# Patient Record
Sex: MALE | Race: WHITE | Employment: UNEMPLOYED | URBAN - METROPOLITAN AREA
[De-identification: names, ages, dates, MRNs, and addresses within clinical notes are randomized per-mention and may not be internally consistent; named-entity substitution may affect disease eponyms.]

---

## 2019-04-09 ENCOUNTER — HOSPITAL ENCOUNTER (EMERGENCY)
Age: 49
Discharge: HOME | End: 2019-04-09
Payer: MEDICAID

## 2019-04-09 VITALS
HEIGHT: 69 IN | HEART RATE: 78 BPM | TEMPERATURE: 97.5 F | WEIGHT: 197 LBS | SYSTOLIC BLOOD PRESSURE: 161 MMHG | BODY MASS INDEX: 29.18 KG/M2 | OXYGEN SATURATION: 96 % | RESPIRATION RATE: 16 BRPM | DIASTOLIC BLOOD PRESSURE: 106 MMHG

## 2019-04-09 DIAGNOSIS — K04.7 DENTAL ABSCESS: Primary | ICD-10-CM

## 2019-04-09 PROCEDURE — 99282 EMERGENCY DEPT VISIT SF MDM: CPT

## 2019-04-09 PROCEDURE — 6370000000 HC RX 637 (ALT 250 FOR IP): Performed by: NURSE PRACTITIONER

## 2019-04-09 RX ORDER — IBUPROFEN 800 MG/1
800 TABLET ORAL EVERY 8 HOURS PRN
Qty: 30 TABLET | Refills: 1 | Status: SHIPPED | OUTPATIENT
Start: 2019-04-09

## 2019-04-09 RX ORDER — IBUPROFEN 800 MG/1
800 TABLET ORAL ONCE
Status: COMPLETED | OUTPATIENT
Start: 2019-04-09 | End: 2019-04-09

## 2019-04-09 RX ORDER — AMOXICILLIN AND CLAVULANATE POTASSIUM 875; 125 MG/1; MG/1
1 TABLET, FILM COATED ORAL 2 TIMES DAILY WITH MEALS
Qty: 20 TABLET | Refills: 0 | Status: SHIPPED | OUTPATIENT
Start: 2019-04-09 | End: 2019-04-19

## 2019-04-09 RX ORDER — HYDROCODONE BITARTRATE AND ACETAMINOPHEN 5; 325 MG/1; MG/1
1 TABLET ORAL ONCE
Status: COMPLETED | OUTPATIENT
Start: 2019-04-09 | End: 2019-04-09

## 2019-04-09 RX ADMIN — HYDROCODONE BITARTRATE AND ACETAMINOPHEN 1 TABLET: 5; 325 TABLET ORAL at 09:05

## 2019-04-09 RX ADMIN — IBUPROFEN 800 MG: 800 TABLET ORAL at 09:04

## 2019-04-09 ASSESSMENT — PAIN SCALES - WONG BAKER: WONGBAKER_NUMERICALRESPONSE: 8

## 2019-04-09 ASSESSMENT — PAIN DESCRIPTION - PROGRESSION: CLINICAL_PROGRESSION: GRADUALLY WORSENING

## 2019-04-09 ASSESSMENT — PAIN DESCRIPTION - PAIN TYPE: TYPE: ACUTE PAIN

## 2019-04-09 ASSESSMENT — PAIN SCALES - GENERAL
PAINLEVEL_OUTOF10: 4
PAINLEVEL_OUTOF10: 10

## 2019-04-09 ASSESSMENT — PAIN DESCRIPTION - FREQUENCY: FREQUENCY: CONTINUOUS

## 2019-04-09 ASSESSMENT — PAIN DESCRIPTION - ORIENTATION: ORIENTATION: LEFT

## 2019-04-09 ASSESSMENT — PAIN DESCRIPTION - LOCATION: LOCATION: TEETH;FACE

## 2019-04-12 NOTE — ED PROVIDER NOTES
[facility]     Department of Emergency Medicine   ED  Provider Note  Admit Date/RoomTime: 4/9/2019  9:03 AM  ED Room: 46/[facility]-1   Chief Complaint   Facial Swelling (complaining of left side facial swelling noticed this am states he does have a bad tooth on that side)    History of Present Illness   Source of history provided by:  patient. History/Exam Limitations: none. Keyur Shahid is a 57 y.o. old male who has a past medical history of:   Past Medical History:   Diagnosis Date    Asthma     Depression     presents to the emergency department by private vehicle, for left upper tooth pain, which occured a few day(s) prior to arrival.  Since onset the symptoms have been constant and moderate in severity. Worsened by  heat, cold and chewing and improved by nothing. Associated Signs & Symptoms:  Facial pain. He denies any fever or chills. He does not have a dentist           Onset:       Spontaneous:   yes. Following Trauma:   no.     Previous Caries:   yes. Recent Dental Procedure:   no.     ROS    Pertinent positives and negatives are stated within HPI, all other systems reviewed and are negative. .    Past Surgical History:  has no past surgical history on file. Social History:  reports that he has quit smoking. He has never used smokeless tobacco. He reports that he has current or past drug history. Drug: Marijuana. He reports that he does not drink alcohol. Family History: family history is not on file. Allergies: Nickel    Physical Exam           ED Triage Vitals   BP Temp Temp Source Pulse Resp SpO2 Height Weight   04/09/19 0859 04/09/19 0859 04/09/19 0859 04/09/19 0807 04/09/19 0859 04/09/19 0807 04/09/19 0859 04/09/19 0859   (!) 187/116 97.5 °F (36.4 °C) Temporal 78 16 95 % 5' 9\" (1.753 m) 197 lb (89.4 kg)      Oxygen Saturation Interpretation: Normal.    · Constitutional:  Alert, development consistent with age. · HEENT:  NC/NT. Airway patent. · Neck:  Supple.  Normal ROM.  · Lips:  upper and lower normal.  · Mouth:  normal tongue and buccal mucosa. TTP to left lower posterior molar with surrounding induration, no definite fluctuance. Trismus: No.         Drooling: No.           Airway stridor: No.  · Facial skin: left no erythema, rash or swelling noted. · Respiratory:  Clear to auscultation and breath sounds equal.    · CV: Regular rate and rhythm, normal heart sounds, without pathological murmurs, ectopy, gallops, or rubs. · Skin:  No rashes, erythema or lesions present, unless noted elsewhere. .  · Lymphatics: No lymphangitis or adenopathy noted. · Neurological:  Oriented. Motor functions intact. Lab / Imaging Results   (All laboratory and radiology results have been personally reviewed by myself)  Labs:  No results found for this visit on 04/09/19. Imaging: All Radiology results interpreted by Radiologist unless otherwise noted. No orders to display     ED Course / Medical Decision Making     Medications   ibuprofen (ADVIL;MOTRIN) tablet 800 mg (800 mg Oral Given 4/9/19 0904)   HYDROcodone-acetaminophen (NORCO) 5-325 MG per tablet 1 tablet (1 tablet Oral Given 4/9/19 0905)        Consult(s):   Dental Resident was not consulted to see patient regarding complaint. Procedure(s):    none. Counseling/MDM:  Patient is well appearing, afebrile, VS stable. Dental pain, possible surrounding abscess. The emergency provider has spoken with the patient and discussed todays results, in addition to providing specific details for the plan of care and counseling regarding the diagnosis and prognosis. Plan is for symptom control and outpatient follow up with dentist. Questions are answered at this time and they are agreeable with the plan. Assessment      1.  Dental abscess      Plan   Discharge to home  Patient condition is good    New Medications     Discharge Medication List as of 4/9/2019  9:12 AM      START taking these medications    Details amoxicillin-clavulanate (AUGMENTIN) 875-125 MG per tablet Take 1 tablet by mouth 2 times daily (with meals) for 10 days, Disp-20 tablet, R-0Print      Magic Mouthwash (MIRACLE MOUTHWASH) Swish and spit 5 mLs 4 times daily as needed for Irritation, Disp-240 mL, R-0Print      ibuprofen (IBU) 800 MG tablet Take 1 tablet by mouth every 8 hours as needed for Pain Take with food. , Disp-30 tablet, R-1Print           Electronically signed by WALLY Franks CNP   DD: 4/12/19  **This report was transcribed using voice recognition software. Every effort was made to ensure accuracy; however, inadvertent computerized transcription errors may be present.   END OF ED PROVIDER NOTE      WALLY Mcgee CNP  04/12/19 9714

## 2019-07-31 ENCOUNTER — HOSPITAL ENCOUNTER (EMERGENCY)
Age: 49
Discharge: HOME | End: 2019-07-31
Attending: EMERGENCY MEDICINE
Payer: MEDICAID

## 2019-07-31 ENCOUNTER — APPOINTMENT (OUTPATIENT)
Dept: GENERAL RADIOLOGY | Age: 49
End: 2019-07-31
Payer: MEDICAID

## 2019-07-31 VITALS
BODY MASS INDEX: 29.55 KG/M2 | WEIGHT: 195 LBS | RESPIRATION RATE: 16 BRPM | OXYGEN SATURATION: 98 % | DIASTOLIC BLOOD PRESSURE: 80 MMHG | TEMPERATURE: 98 F | SYSTOLIC BLOOD PRESSURE: 136 MMHG | HEART RATE: 80 BPM | HEIGHT: 68 IN

## 2019-07-31 DIAGNOSIS — S82.832A OTHER CLOSED FRACTURE OF DISTAL END OF LEFT FIBULA, INITIAL ENCOUNTER: Primary | ICD-10-CM

## 2019-07-31 PROCEDURE — 73610 X-RAY EXAM OF ANKLE: CPT

## 2019-07-31 PROCEDURE — 99283 EMERGENCY DEPT VISIT LOW MDM: CPT

## 2019-07-31 RX ORDER — OXYCODONE HYDROCHLORIDE AND ACETAMINOPHEN 5; 325 MG/1; MG/1
1 TABLET ORAL EVERY 8 HOURS PRN
Qty: 6 TABLET | Refills: 0 | Status: SHIPPED | OUTPATIENT
Start: 2019-07-31 | End: 2019-08-02

## 2019-07-31 RX ORDER — NAPROXEN 500 MG/1
500 TABLET ORAL 2 TIMES DAILY
Qty: 10 TABLET | Refills: 0 | Status: SHIPPED | OUTPATIENT
Start: 2019-07-31 | End: 2021-03-03 | Stop reason: ALTCHOICE

## 2019-07-31 ASSESSMENT — PAIN SCALES - GENERAL: PAINLEVEL_OUTOF10: 8

## 2019-07-31 ASSESSMENT — PAIN DESCRIPTION - PROGRESSION: CLINICAL_PROGRESSION: GRADUALLY WORSENING

## 2019-07-31 ASSESSMENT — PAIN DESCRIPTION - PAIN TYPE: TYPE: ACUTE PAIN

## 2019-07-31 ASSESSMENT — PAIN DESCRIPTION - FREQUENCY: FREQUENCY: CONTINUOUS

## 2019-07-31 ASSESSMENT — PAIN DESCRIPTION - ORIENTATION: ORIENTATION: LEFT

## 2019-07-31 ASSESSMENT — PAIN DESCRIPTION - LOCATION: LOCATION: ANKLE

## 2019-07-31 NOTE — ED NOTES
Discharge instructions to pt to follow up with Dr Harrison Ackerman short leg OCL applied per Prema Reid left leg toes warm pedal pulse palapable moving left toes easily brisk capillary refill crutches to pt with instruction ice continued     Jayna Kerr RN  07/31/19 4036

## 2019-07-31 NOTE — ED PROVIDER NOTES
HPI:  7/31/19, Time: 9:37 AM         Abi Ragland is a 57 y.o. male presenting to the ED for left ankle pain, beginning of hours ago. The complaint has been persistent, moderate in severity, and worsened by walking. States he twisted his left ankle stepping down off a patio last night. Denies any head trauma or loss of consciousness. Describes the pain is sharp and on the lateral aspect. Denies any numbness or tingling. Denies any previous injury to this ankle. Review of Systems:   Pertinent positives and negatives are stated within HPI, all other systems reviewed and are negative.          --------------------------------------------- PAST HISTORY ---------------------------------------------  Past Medical History:  has a past medical history of Asthma and Depression. Past Surgical History:  has no past surgical history on file. Social History:  reports that he has quit smoking. He has never used smokeless tobacco. He reports that he has current or past drug history. Drug: Marijuana. He reports that he does not drink alcohol. Family History: family history is not on file. The patients home medications have been reviewed. Allergies: Nickel    -------------------------------------------------- RESULTS -------------------------------------------------  All laboratory and radiology results have been personally reviewed by myself   LABS:  No results found for this visit on 07/31/19. RADIOLOGY:  Interpreted by Radiologist.  XR ANKLE LEFT (MIN 3 VIEWS)   Final Result   ALERT:  THIS IS AN ABNORMAL REPORT   1. Minimally displaced distal fibular/lateral malleolus transverse   fracture. 2. Soft tissue swelling overlying lateral malleolus. ------------------------- NURSING NOTES AND VITALS REVIEWED ---------------------------   The nursing notes within the ED encounter and vital signs as below have been reviewed.    BP (!) 152/84   Pulse 80   Temp 98 °F (36.7 °C)   Resp 16   Ht 5' 8\" (1.727 m)   Wt 195 lb (88.5 kg)   SpO2 98%   BMI 29.65 kg/m²   Oxygen Saturation Interpretation: Normal      ---------------------------------------------------PHYSICAL EXAM--------------------------------------      Constitutional/General: Alert and oriented x3, well appearing, non toxic in NAD  Head: Normocephalic and atraumatic  Eyes: PERRL, EOMI  Mouth: Oropharynx clear, handling secretions, no trismus  Neck: Supple, full ROM,   Pulmonary: Lungs clear to auscultation bilaterally, no wheezes, rales, or rhonchi. Not in respiratory distress  Cardiovascular:  Regular rate and rhythm, no murmurs, gallops, or rubs. 2+ distal pulses  Abdomen: Soft, non tender, non distended,   Extremities: Moves all extremities x 4. Warm and well perfused. TTP of the lateral ankle with edema and mild ecchymosis. Skin: warm and dry without rash  Neurologic: GCS 15,  Psych: Normal Affect      ------------------------------ ED COURSE/MEDICAL DECISION MAKING----------------------  Medications - No data to display      ED COURSE:  ED Course as of Jul 31 1045 Wed Jul 31, 2019   1010 Distal fibular fracture. Pain well controlled. Splint placed and patient educated on crutches. Will ensure follow up with orthopedics. [CL]      ED Course User Index  [CL] Katerin Morales DO       Medical Decision Making:    Patient appears well and nontoxic. Vital signs WNL. Evidence of left distal nondisplaced fibular fracture. Posterior short leg with sugar tong splint placed. Patient given crutches. Orthopedic follow-up with Dr. Janie Nixon ensured by phone conversation. Be given Naprosyn as well as a short course of Percocet. . Advised to return for new or worsening symptoms including worsening pain, numbness or tingling. Advised to follow up with PCP in 1-2 days. Patient agreeable and discharged home in stable condition. Counseling:    The emergency provider has spoken with the patient and discussed todays results, in addition to

## 2019-09-09 ENCOUNTER — HOSPITAL ENCOUNTER (EMERGENCY)
Age: 49
Discharge: HOME | End: 2019-09-09
Attending: EMERGENCY MEDICINE
Payer: MEDICAID

## 2019-09-09 VITALS
TEMPERATURE: 98.5 F | HEART RATE: 78 BPM | DIASTOLIC BLOOD PRESSURE: 85 MMHG | RESPIRATION RATE: 16 BRPM | WEIGHT: 200 LBS | OXYGEN SATURATION: 97 % | BODY MASS INDEX: 30.31 KG/M2 | SYSTOLIC BLOOD PRESSURE: 137 MMHG | HEIGHT: 68 IN

## 2019-09-09 DIAGNOSIS — R59.0 AXILLARY LYMPHADENOPATHY: Primary | ICD-10-CM

## 2019-09-09 PROCEDURE — 99282 EMERGENCY DEPT VISIT SF MDM: CPT

## 2019-09-09 RX ORDER — HYDROCODONE BITARTRATE AND ACETAMINOPHEN 5; 325 MG/1; MG/1
1 TABLET ORAL EVERY 6 HOURS PRN
Qty: 10 TABLET | Refills: 0 | Status: SHIPPED | OUTPATIENT
Start: 2019-09-09 | End: 2019-09-12

## 2019-09-09 ASSESSMENT — PAIN DESCRIPTION - LOCATION: LOCATION: ARM

## 2019-09-09 ASSESSMENT — PAIN DESCRIPTION - ORIENTATION: ORIENTATION: LEFT

## 2019-09-09 ASSESSMENT — PAIN SCALES - GENERAL: PAINLEVEL_OUTOF10: 7

## 2019-09-10 ENCOUNTER — TELEPHONE (OUTPATIENT)
Dept: ADMINISTRATIVE | Age: 49
End: 2019-09-10

## 2019-09-11 ENCOUNTER — OFFICE VISIT (OUTPATIENT)
Dept: SURGERY | Age: 49
End: 2019-09-11
Payer: MEDICAID

## 2019-09-11 VITALS
RESPIRATION RATE: 18 BRPM | SYSTOLIC BLOOD PRESSURE: 139 MMHG | BODY MASS INDEX: 29.86 KG/M2 | OXYGEN SATURATION: 93 % | TEMPERATURE: 98.1 F | WEIGHT: 197 LBS | HEIGHT: 68 IN | DIASTOLIC BLOOD PRESSURE: 90 MMHG | HEART RATE: 91 BPM

## 2019-09-11 DIAGNOSIS — M79.89 LEFT AXILLARY SWELLING: Primary | ICD-10-CM

## 2019-09-11 PROCEDURE — 99203 OFFICE O/P NEW LOW 30 MIN: CPT | Performed by: SURGERY

## 2019-09-11 PROCEDURE — G8419 CALC BMI OUT NRM PARAM NOF/U: HCPCS | Performed by: SURGERY

## 2019-09-11 PROCEDURE — 1036F TOBACCO NON-USER: CPT | Performed by: SURGERY

## 2019-09-11 PROCEDURE — G8427 DOCREV CUR MEDS BY ELIG CLIN: HCPCS | Performed by: SURGERY

## 2019-09-11 RX ORDER — AZITHROMYCIN 250 MG/1
250 TABLET, FILM COATED ORAL SEE ADMIN INSTRUCTIONS
Qty: 6 TABLET | Refills: 0 | Status: SHIPPED | OUTPATIENT
Start: 2019-09-11 | End: 2019-09-16

## 2019-09-11 RX ORDER — SULFAMETHOXAZOLE AND TRIMETHOPRIM 800; 160 MG/1; MG/1
1 TABLET ORAL 2 TIMES DAILY
Qty: 20 TABLET | Refills: 0 | Status: SHIPPED | OUTPATIENT
Start: 2019-09-11 | End: 2019-09-21

## 2019-09-11 ASSESSMENT — ENCOUNTER SYMPTOMS
ABDOMINAL PAIN: 0
COUGH: 0
VOMITING: 0
BACK PAIN: 0
SHORTNESS OF BREATH: 0
BLOOD IN STOOL: 0
PHOTOPHOBIA: 0
EYE REDNESS: 0
NAUSEA: 0
SORE THROAT: 0
CONSTIPATION: 0
DIARRHEA: 0
WHEEZING: 0

## 2019-09-11 NOTE — PROGRESS NOTES
History and Physical    Patient's Name/Date of Birth: Daksha Rosa / 1970 (6 y.o.)    Date: September 11, 2019     Chief Complaint:   Chief Complaint   Patient presents with    Mass     mass in left axilla         HPI: Patient is a 82-year-old male who reports 1 week history of swelling in his left armpit area. This is quite painful. He did present to the emergency room yesterday. An ultrasound was performed reporting no fluid within the palpable mass. He denies any history of this happening previously. He has no contact with cats. He denies any fever chills or night sweats or weight loss. Past Medical History:   Diagnosis Date    Asthma     Depression        History reviewed. No pertinent surgical history. Prior to Admission medications    Medication Sig Start Date End Date Taking? Authorizing Provider   azithromycin (ZITHROMAX) 250 MG tablet Take 1 tablet by mouth See Admin Instructions for 5 days 500mg on day 1 followed by 250mg on days 2 - 5 9/11/19 9/16/19 Yes Jose Regalado,    sulfamethoxazole-trimethoprim (BACTRIM DS;SEPTRA DS) 800-160 MG per tablet Take 1 tablet by mouth 2 times daily for 10 days 9/11/19 9/21/19 Yes Jose Regalado,    HYDROcodone-acetaminophen (NORCO) 5-325 MG per tablet Take 1 tablet by mouth every 6 hours as needed for Pain for up to 3 days. Intended supply: 3 days. Take lowest dose possible to manage pain 9/9/19 9/12/19 Yes Ailyn Valles PA-C   ibuprofen (IBU) 800 MG tablet Take 1 tablet by mouth every 8 hours as needed for Pain Take with food.  4/9/19  Yes WALLY Berger CNP   albuterol (PROVENTIL) (2.5 MG/3ML) 0.083% nebulizer solution Take 2.5 mg by nebulization every 6 hours as needed for Wheezing   Yes Historical Provider, MD   albuterol sulfate HFA (PROVENTIL HFA) 108 (90 BASE) MCG/ACT inhaler Inhale 2 puffs into the lungs every 6 hours as needed for Wheezing 1/4/16  Yes Robby Lopez MD   naproxen (NAPROSYN) 500 MG tablet Take 1 tablet Negative for chills and fever. HENT: Negative for ear pain, nosebleeds, sore throat and tinnitus. Eyes: Negative for photophobia and redness. Respiratory: Negative for cough, shortness of breath and wheezing. Cardiovascular: Negative for chest pain and palpitations. Gastrointestinal: Negative for abdominal pain, blood in stool, constipation, diarrhea, nausea and vomiting. Endocrine: Negative for polydipsia. Genitourinary: Negative for dysuria, hematuria and urgency. Musculoskeletal: Negative for back pain and neck pain. Swelling in the left armpit   Skin: Negative for rash. Neurological: Negative for dizziness, tremors and seizures. Hematological: Does not bruise/bleed easily. All other systems reviewed and are negative. Physical Exam:  Vitals:    09/11/19 1238 09/11/19 1244   BP: (!) 143/102 (!) 139/90   Pulse: 91    Resp: 18    Temp: 98.1 °F (36.7 °C)    TempSrc: Oral    SpO2: 93%    Weight: 197 lb (89.4 kg)    Height: 5' 8\" (1.727 m)        General: Well nourished, well developed, no acute distress  Eyes:  PERRL   Conjunctiva unremarkable   ENT:  TM's intact bilaterally, no effusion   Nasal:  Nomucosal edema     No nasal drainage   Oral:  mucosa moist and pink   Neck:  Supple   No palpable cervical lymphoadenopathy   Thyroid without mass or enlargement  Resp: Lungs CTAB   Equal and adequate air exchange without accessory muscle use   No rales, rhonchi or wheeze  CV: S1S2 RRR   No murmur   Intact distal pulses   No edema  GI: Abdomen Soft, nontender, non distended   Normoactive bowel sounds   No palpable hepatosplenomegaly  MS: In the left axilla there is approximately 4-1/2 cm tender palpable soft tissue mass. There is no significant fluctuance. There is however overlying erythema.   Physiologic ROM of all extremities    Intact distal pulses   No clubbing or cyanosis   Skin Warmand dry; no rash or lesion  Neuro: Alert and oriented; normal and intact dtr's   Psych: Euthymic

## 2019-09-25 ENCOUNTER — TELEPHONE (OUTPATIENT)
Dept: SURGERY | Age: 49
End: 2019-09-25

## 2020-01-29 ENCOUNTER — HOSPITAL ENCOUNTER (OUTPATIENT)
Dept: PULMONOLOGY | Age: 50
Discharge: HOME | End: 2020-01-29
Payer: MEDICAID

## 2020-01-29 PROCEDURE — 94729 DIFFUSING CAPACITY: CPT | Performed by: INTERNAL MEDICINE

## 2020-01-29 PROCEDURE — 94726 PLETHYSMOGRAPHY LUNG VOLUMES: CPT | Performed by: INTERNAL MEDICINE

## 2020-01-29 PROCEDURE — 94060 EVALUATION OF WHEEZING: CPT

## 2020-01-29 PROCEDURE — 94726 PLETHYSMOGRAPHY LUNG VOLUMES: CPT

## 2020-01-29 PROCEDURE — 94729 DIFFUSING CAPACITY: CPT

## 2020-01-29 PROCEDURE — 94060 EVALUATION OF WHEEZING: CPT | Performed by: INTERNAL MEDICINE

## 2020-01-30 NOTE — PROCEDURES
[address]                  [address] [id number],  [facility]                               PULMONARY FUNCTION    PATIENT NAME: Lyle Torres                      :        1970  MED REC NO:   [id number]                            ROOM:  ACCOUNT NO:   [de-identified]                           ADMIT DATE: 2020  PROVIDER:     Bharti Mills DO    DATE OF PROCEDURE:  2020    INDICATION:  A 71-year-old  male, 69 inches, 200 pounds with  COPD, [id number] smoker. FINDINGS:  Spirometry revealed forced vital capacity postbronchodilator  of 3.37 liters, 69% of predicted with an FEV1 of 2.49 liters, 64% of  predicted with an FEV1/FVC ratio of 71%. A 20% bronchodilator response  noted with the prebronchodilator FEV1 of 2.06 liters, 53% of predicted. Maximum voluntary ventilation is 79 liters per minute, 51% of predicted. Static lung volumes with slow vital capacity of 2.45 liters, 60% of  predicted. Inspiratory capacity of 1.70 liters, 50% of predicted. Expiratory reserve volume of 0.63 liters, 40% of predicted. Thoracic  gas volume of 9.37 liters, 274% of predicted. Residual volume of 8.62  liters, 446% of predicted. Total lung capacity of 11.07 liters, 163% of  predicted with an RV/TLC ratio of 268%. The diffusion capacity is  reduced to 21.58 mL/min per mmHg which was 69% of predicted. Specific  airway conductance is severely reduced. IMPRESSION:  Pulmonary function test suggestive of hyperreactive airway  disease as noted by the significant bronchodilator response  postbronchodilator with significant air trapping as indicated and  hyperinflation is indicated by the increased thoracic gas volume,  residual volume, total lung capacity, and RV/TLC ratio. The diffusion  capacity is reduced indicates a loss in gas transfer. Specific airway  conductance is profoundly reduced indicating the increased air flow  resistance. Clinical correlation is needed.         Jung Robles DO    D: 01/29/2020 17:23:49       T: 01/30/2020 1:03:33     [email or web address]  Job#: [id number]     Doc#: [id number]    CC:

## 2020-07-02 ENCOUNTER — HOSPITAL ENCOUNTER (OUTPATIENT)
Dept: GENERAL RADIOLOGY | Age: 50
Discharge: HOME | End: 2020-07-04
Payer: MEDICAID

## 2020-07-02 ENCOUNTER — HOSPITAL ENCOUNTER (OUTPATIENT)
Age: 50
Discharge: HOME | End: 2020-07-04
Payer: MEDICAID

## 2020-07-02 PROCEDURE — 73030 X-RAY EXAM OF SHOULDER: CPT

## 2021-03-03 ENCOUNTER — OFFICE VISIT (OUTPATIENT)
Dept: ORTHOPEDIC SURGERY | Age: 51
End: 2021-03-03
Payer: MEDICAID

## 2021-03-03 VITALS — WEIGHT: 200 LBS | HEIGHT: 69 IN | BODY MASS INDEX: 29.62 KG/M2

## 2021-03-03 DIAGNOSIS — M25.511 RIGHT SHOULDER PAIN, UNSPECIFIED CHRONICITY: Primary | ICD-10-CM

## 2021-03-03 PROCEDURE — 99204 OFFICE O/P NEW MOD 45 MIN: CPT | Performed by: ORTHOPAEDIC SURGERY

## 2021-03-03 PROCEDURE — G8419 CALC BMI OUT NRM PARAM NOF/U: HCPCS | Performed by: ORTHOPAEDIC SURGERY

## 2021-03-03 PROCEDURE — G8427 DOCREV CUR MEDS BY ELIG CLIN: HCPCS | Performed by: ORTHOPAEDIC SURGERY

## 2021-03-03 PROCEDURE — G8484 FLU IMMUNIZE NO ADMIN: HCPCS | Performed by: ORTHOPAEDIC SURGERY

## 2021-03-03 PROCEDURE — 1036F TOBACCO NON-USER: CPT | Performed by: ORTHOPAEDIC SURGERY

## 2021-03-03 NOTE — PROGRESS NOTES
New Shoulder Patient Visit     Referring Provider:   Hollis Leger, WALLY - CNP  [facility]    CHIEF COMPLAINT:   Chief Complaint   Patient presents with    Shoulder Pain     ( R ) shoulder pain x 1 year. No injury that he can recall - states that he used to play football/baseball and thinks something happened then. Limited ROM, has to manuever his arm to move it unable to move it straight up. Overall weakness. RHD. Pt is an artisit     Results     MRI [facility]         HPI:      Teresita Bee is a 52y.o. year old right-hand-dominant male who works normally as a  but currently is unemployed, who presents with right shoulder pain. He thinks the pain came on about a year ago when he was either playing football or baseball and felt a snap in his shoulder. He is somewhat of a poor historian regarding history of his pain. He describes significant mechanical symptoms and inability to forward elevate all the way without maneuvering his arm. He has tried some therapy which she felt made him worse. He has not had any other treatment. PAST MEDICAL HISTORY  Past Medical History:   Diagnosis Date    Asthma     Depression        PAST SURGICAL HISTORY  No past surgical history on file. FAMILY HISTORY   No family history on file. SOCIAL HISTORY  Social History     Occupational History    Not on file   Tobacco Use    Smoking status: Former Smoker    Smokeless tobacco: Never Used   Substance and Sexual Activity    Alcohol use: No    Drug use: Not Currently     Types: Marijuana    Sexual activity: Not on file       CURRENT MEDICATIONS     Current Outpatient Medications:     ibuprofen (IBU) 800 MG tablet, Take 1 tablet by mouth every 8 hours as needed for Pain Take with food. , Disp: 30 tablet, Rfl: 1    albuterol (PROVENTIL) (2.5 MG/3ML) 0.083% nebulizer solution, Take 2.5 mg by nebulization every 6 hours as needed for Wheezing, Disp: , Rfl:     albuterol sulfate HFA (PROVENTIL HFA) 108 (90 BASE) MCG/ACT inhaler, Inhale 2 puffs into the lungs every 6 hours as needed for Wheezing, Disp: 1 Inhaler, Rfl: 3    Magic Mouthwash (MIRACLE MOUTHWASH), Swish and spit 5 mLs 4 times daily as needed for Irritation (Patient not taking: Reported on 9/11/2019), Disp: 240 mL, Rfl: 0    ALLERGIES  Allergies   Allergen Reactions    Nickel Rash       Controlled Substances Monitoring:        REVIEW OF SYSTEMS:     Constitutional:  Negative for weight loss, fevers, chills, fatigue  Cardiovascular: Negative for chest pain, palpitations  Pulmonary: Negative for shortness of breath, labored breathing, cough  GI: negative for abdominal pain, nausea, vomitting   MSK: per HPI  Skin: negative for rash, open wounds    All other systems reviewed and are negative           PHYSICAL EXAM     Vitals:    03/03/21 1410   Weight: 200 lb (90.7 kg)   Height: 5' 9\" (1.753 m)       Height: 5' 9\" (1.753 m)  Weight: [unfilled]  BMI:  Body mass index is 29.53 kg/m². General: The patient is alert and oriented x 3, appears to be stated age and in no distress. HEENT: head is normocephalic, atraumatic. EOMI. Neck: supple, trachea midline, no thyromegaly   Cardiovascular: peripheral pulses palpable. Normal Capillary refill   Respiratory: breathing unlabored, chest expansion symmetric   Skin: no rash, no open wounds, no erythema  Psych: normal affect; mood stable  Neurologic: gait normal, sensation grossly intact in extremities  MSK:    Cervical Spine: There is no tenderness to palpation along the cervical spine. Range of motion is normal.  Spurling's is negative    Shoulder Exam:   Exam of the shoulder shows that he can with some effort get his arm up to about 150 degrees of elevation. Around 90 degrees he have to do some significant rotational maneuvering of his arm to get it up.   He has pain and weakness with Onofre test.  There is pain with speeds test.  Duchesne's test is equivocal.  Belly press test is mildly positive for pain at end range of motion. There is some tenderness directly over the biceps tendon    IMAGING:     XRAY:  3 views of the right shoulder show no acute abnormality on x-ray    MRI of the shoulder was reviewed from [facility] which shows probable biceps dislocation/subluxation where the biceps is poorly visualized as it leaves the groove and enters the shoulder. There is abnormality the anterior labrum. There is high-grade partial near full-thickness tearing at the insertion of the supraspinatus    Radiographic findings reviewed with patient    ASSESSMENT   Right shoulder high-grade partial versus small full-thickness supraspinatus tear, biceps subluxation      PLAN  We discussed his shoulder today. He is having significant mechanical symptoms that I think are related to his biceps tendon. He has been dealing with this for over a year. We discussed trial of injection and more PT. We also discussed surgical options. We discussed surgical right shoulder arthroscopy, rotator cuff repair versus debridement, possible Regeneten patch, possible biceps tenotomy versus tenodesis. After discussion of both options including the risks and benefits of both he would like to proceed with surgery. We will look at scheduling this in the near future and we will see him back for a preoperative visit.         Hari Worley MD  Orthopaedic Surgery   3/3/21  5:02 PM

## 2021-03-12 ENCOUNTER — TELEPHONE (OUTPATIENT)
Dept: NON INVASIVE DIAGNOSTICS | Age: 51
End: 2021-03-12

## 2021-03-12 NOTE — TELEPHONE ENCOUNTER
CALLED PATIENT TO SCHEDULE STRESS TEST, UNABLE TO LEAVE A MESSAGE, RECORDING STATES THAT THEY ARE NOT ACCEPTING CALL AT THIS TIME.

## 2021-03-29 ENCOUNTER — TELEPHONE (OUTPATIENT)
Dept: NON INVASIVE DIAGNOSTICS | Age: 51
End: 2021-03-29

## 2021-03-31 ENCOUNTER — HOSPITAL ENCOUNTER (OUTPATIENT)
Dept: NUCLEAR MEDICINE | Age: 51
Discharge: HOME | End: 2021-04-02
Payer: MEDICAID

## 2021-03-31 ENCOUNTER — HOSPITAL ENCOUNTER (OUTPATIENT)
Dept: NON INVASIVE DIAGNOSTICS | Age: 51
Discharge: HOME | End: 2021-03-31
Payer: MEDICAID

## 2021-03-31 DIAGNOSIS — R06.02 SHORT OF BREATH ON EXERTION: ICD-10-CM

## 2021-03-31 LAB
LV EF: 57 %
LVEF MODALITY: NORMAL

## 2021-03-31 PROCEDURE — 93017 CV STRESS TEST TRACING ONLY: CPT

## 2021-03-31 PROCEDURE — 78452 HT MUSCLE IMAGE SPECT MULT: CPT | Performed by: INTERNAL MEDICINE

## 2021-03-31 PROCEDURE — 93018 CV STRESS TEST I&R ONLY: CPT | Performed by: INTERNAL MEDICINE

## 2021-03-31 PROCEDURE — 3430000000 HC RX DIAGNOSTIC RADIOPHARMACEUTICAL: Performed by: RADIOLOGY

## 2021-03-31 PROCEDURE — 6360000002 HC RX W HCPCS: Performed by: NURSE PRACTITIONER

## 2021-03-31 PROCEDURE — A9500 TC99M SESTAMIBI: HCPCS | Performed by: RADIOLOGY

## 2021-03-31 PROCEDURE — 78452 HT MUSCLE IMAGE SPECT MULT: CPT

## 2021-03-31 PROCEDURE — 93016 CV STRESS TEST SUPVJ ONLY: CPT | Performed by: INTERNAL MEDICINE

## 2021-03-31 RX ADMIN — Medication 12 MILLICURIE: at 09:13

## 2021-03-31 RX ADMIN — REGADENOSON 0.4 MG: 0.08 INJECTION, SOLUTION INTRAVENOUS at 10:38

## 2021-03-31 RX ADMIN — Medication 35 MILLICURIE: at 12:36

## 2021-03-31 NOTE — PROCEDURES
Pharmacologic Nuclear Stress Test    Date: 3/31/2021    Indication: Chest pain    Description of procedure:    Protocol: Regadenoson stress SPECT myocardial perfusion imaging    Baseline EKG: NSR 75 bpm, normal axis/intervals. No ST/T changes. Baseline BP: 140/76 mmHg    0.4 mg of regadenoson was injected followed by radiotracer injection. Patient reported no chest pain. Stress EKG showed no evidence of ischemia, and no arrhythmias were noted. Impression:  1. No evidence of regadenoson induced ischemia on stress EKG. 2. Nuclear images to be reported separately.     Chevis Mcardle MD, [address] [facility]

## 2021-04-07 ENCOUNTER — TELEPHONE (OUTPATIENT)
Dept: ORTHOPEDIC SURGERY | Age: 51
End: 2021-04-07

## 2021-04-07 NOTE — TELEPHONE ENCOUNTER
[facility] provider portal prior authorization approval for  Right shoulder arthroscopy, RC repair vs debridement, possible regeneten patch, poss biceps tenotomy vs tenodesis 4/15/21 FRANK Barrow.     Prior Authorization Approval Number [id number]

## 2021-04-09 ENCOUNTER — HOSPITAL ENCOUNTER (OUTPATIENT)
Age: 51
Discharge: HOME | End: 2021-04-11
Payer: MEDICAID

## 2021-04-09 DIAGNOSIS — Z01.818 PREOP TESTING: ICD-10-CM

## 2021-04-09 PROCEDURE — U0005 INFEC AGEN DETEC AMPLI PROBE: HCPCS

## 2021-04-09 PROCEDURE — U0003 INFECTIOUS AGENT DETECTION BY NUCLEIC ACID (DNA OR RNA); SEVERE ACUTE RESPIRATORY SYNDROME CORONAVIRUS 2 (SARS-COV-2) (CORONAVIRUS DISEASE [COVID-19]), AMPLIFIED PROBE TECHNIQUE, MAKING USE OF HIGH THROUGHPUT TECHNOLOGIES AS DESCRIBED BY CMS-2020-01-R: HCPCS

## 2021-04-11 LAB — SARS-COV-2, PCR: NOT DETECTED

## 2021-04-12 ENCOUNTER — HOSPITAL ENCOUNTER (OUTPATIENT)
Dept: GENERAL RADIOLOGY | Age: 51
Discharge: HOME | End: 2021-04-14
Payer: MEDICAID

## 2021-04-12 ENCOUNTER — HOSPITAL ENCOUNTER (OUTPATIENT)
Age: 51
Discharge: HOME | End: 2021-04-14
Payer: MEDICAID

## 2021-04-12 DIAGNOSIS — R52 PAIN: ICD-10-CM

## 2021-04-12 PROCEDURE — 72100 X-RAY EXAM L-S SPINE 2/3 VWS: CPT

## 2021-04-12 PROCEDURE — 71046 X-RAY EXAM CHEST 2 VIEWS: CPT

## 2021-04-13 RX ORDER — AMLODIPINE BESYLATE 5 MG/1
TABLET ORAL
COMMUNITY
Start: 2021-03-10

## 2021-04-13 RX ORDER — BUDESONIDE AND FORMOTEROL FUMARATE DIHYDRATE 160; 4.5 UG/1; UG/1
AEROSOL RESPIRATORY (INHALATION)
COMMUNITY
Start: 2021-03-14

## 2021-04-13 RX ORDER — HYDROCHLOROTHIAZIDE 12.5 MG/1
12.5 CAPSULE, GELATIN COATED ORAL DAILY
COMMUNITY

## 2021-04-13 RX ORDER — TIOTROPIUM BROMIDE INHALATION SPRAY 1.56 UG/1
SPRAY, METERED RESPIRATORY (INHALATION)
COMMUNITY
Start: 2021-03-15

## 2021-04-13 NOTE — PROGRESS NOTES
[facility] PRE-ADMISSION TESTING INSTRUCTIONS    The Preadmission Testing patient is instructed accordingly using the following criteria (check applicable):    ARRIVAL INSTRUCTIONS:  [x] Parking the day of Surgery is located in the Main Entrance lot. Upon entering the door, make an immediate right to the surgery reception desk    [x] Bring photo ID and insurance card    [] Bring in a copy of Living will or Durable Power of  papers. [x] Please be sure to arrange transportation to and from the hospital    [x] Please arrange for someone to be with you the remainder of the day due to having anesthesia      GENERAL INSTRUCTIONS:    [x] Nothing by mouth after midnight, including gum, candy, mints or water    [x] You may brush your teeth, but do not swallow any water    [x] Take medications as instructed with 1-2 oz of water    [x] Stop herbal supplements and vitamins 5 days prior to procedure    [] Follow preop dosing of blood thinners per physician instructions    [] Do not take insulin or oral diabetic medications    [] If diabetic and have low blood sugar or feel symptomatic, take 1-2oz apple juice or glucose tablets    [x] Bring inhalers day of surgery    [] Bring C-PAP/ Bi-Pap day of surgery    [] Bring urine specimen day of surgery    [x] Soap shower or bath AM of Surgery, no lotion, powders or creams to surgical site    [] Follow bowel prep as instructed per surgeon    [x] No tobacco products within 24 hours of surgery     [x] No alcohol or illegal drug use within 24 hours of surgery.     [x] Jewelry, body piercing's, eyeglasses, contact lenses and dentures are not permitted into surgery (bring cases)      [] Please do not wear any nail polish or make up on the day of surgery    [x] If not already done, you can expect a call from registration    [x] If surgeon requests a time change you will be notified the day prior to surgery    [x] If you receive a survey after surgery we

## 2021-04-14 ENCOUNTER — ANESTHESIA EVENT (OUTPATIENT)
Dept: OPERATING ROOM | Age: 51
End: 2021-04-14
Payer: MEDICAID

## 2021-04-14 ENCOUNTER — HOSPITAL ENCOUNTER (OUTPATIENT)
Dept: PREADMISSION TESTING | Age: 51
Discharge: HOME | End: 2021-04-14
Payer: MEDICAID

## 2021-04-14 LAB
ANION GAP SERPL CALCULATED.3IONS-SCNC: 10 MMOL/L (ref 7–16)
BUN BLDV-MCNC: 13 MG/DL (ref 6–20)
CALCIUM SERPL-MCNC: 9.5 MG/DL (ref 8.6–10.2)
CHLORIDE BLD-SCNC: 100 MMOL/L (ref 98–107)
CO2: 27 MMOL/L (ref 22–29)
CREAT SERPL-MCNC: 0.8 MG/DL (ref 0.7–1.2)
GFR AFRICAN AMERICAN: >60
GFR NON-AFRICAN AMERICAN: >60 ML/MIN/1.73
GLUCOSE BLD-MCNC: 114 MG/DL (ref 74–99)
POTASSIUM SERPL-SCNC: 4.1 MMOL/L (ref 3.5–5)
SODIUM BLD-SCNC: 137 MMOL/L (ref 132–146)

## 2021-04-14 PROCEDURE — 36415 COLL VENOUS BLD VENIPUNCTURE: CPT

## 2021-04-14 PROCEDURE — 80048 BASIC METABOLIC PNL TOTAL CA: CPT

## 2021-04-15 ENCOUNTER — ANESTHESIA (OUTPATIENT)
Dept: OPERATING ROOM | Age: 51
End: 2021-04-15
Payer: MEDICAID

## 2021-04-15 ENCOUNTER — HOSPITAL ENCOUNTER (OUTPATIENT)
Age: 51
Setting detail: OUTPATIENT SURGERY
Discharge: HOME | End: 2021-04-15
Attending: ORTHOPAEDIC SURGERY | Admitting: ORTHOPAEDIC SURGERY
Payer: MEDICAID

## 2021-04-15 VITALS
SYSTOLIC BLOOD PRESSURE: 137 MMHG | WEIGHT: 200 LBS | RESPIRATION RATE: 18 BRPM | HEIGHT: 67 IN | DIASTOLIC BLOOD PRESSURE: 88 MMHG | HEART RATE: 76 BPM | TEMPERATURE: 97.6 F | OXYGEN SATURATION: 94 % | BODY MASS INDEX: 31.39 KG/M2

## 2021-04-15 VITALS
RESPIRATION RATE: 3 BRPM | TEMPERATURE: 81.9 F | SYSTOLIC BLOOD PRESSURE: 167 MMHG | OXYGEN SATURATION: 92 % | DIASTOLIC BLOOD PRESSURE: 120 MMHG

## 2021-04-15 DIAGNOSIS — Z01.818 PREOP TESTING: ICD-10-CM

## 2021-04-15 DIAGNOSIS — Z98.890 STATUS POST ARTHROSCOPY OF RIGHT SHOULDER: Primary | ICD-10-CM

## 2021-04-15 PROCEDURE — 94664 DEMO&/EVAL PT USE INHALER: CPT

## 2021-04-15 PROCEDURE — 2500000003 HC RX 250 WO HCPCS

## 2021-04-15 PROCEDURE — L3650 SO 8 ABD RESTRAINT PRE OTS: HCPCS | Performed by: ORTHOPAEDIC SURGERY

## 2021-04-15 PROCEDURE — 3600000004 HC SURGERY LEVEL 4 BASE: Performed by: ORTHOPAEDIC SURGERY

## 2021-04-15 PROCEDURE — 3700000001 HC ADD 15 MINUTES (ANESTHESIA): Performed by: ORTHOPAEDIC SURGERY

## 2021-04-15 PROCEDURE — 2709999900 HC NON-CHARGEABLE SUPPLY: Performed by: ORTHOPAEDIC SURGERY

## 2021-04-15 PROCEDURE — 6360000002 HC RX W HCPCS: Performed by: ANESTHESIOLOGY

## 2021-04-15 PROCEDURE — 29999 UNLISTED PX ARTHROSCOPY: CPT | Performed by: ORTHOPAEDIC SURGERY

## 2021-04-15 PROCEDURE — 6360000002 HC RX W HCPCS

## 2021-04-15 PROCEDURE — 29826 SHO ARTHRS SRG DECOMPRESSION: CPT | Performed by: ORTHOPAEDIC SURGERY

## 2021-04-15 PROCEDURE — 7100000000 HC PACU RECOVERY - FIRST 15 MIN: Performed by: ORTHOPAEDIC SURGERY

## 2021-04-15 PROCEDURE — 2580000003 HC RX 258

## 2021-04-15 PROCEDURE — 7100000011 HC PHASE II RECOVERY - ADDTL 15 MIN: Performed by: ORTHOPAEDIC SURGERY

## 2021-04-15 PROCEDURE — 2720000010 HC SURG SUPPLY STERILE: Performed by: ORTHOPAEDIC SURGERY

## 2021-04-15 PROCEDURE — 64415 NJX AA&/STRD BRCH PLXS IMG: CPT | Performed by: ANESTHESIOLOGY

## 2021-04-15 PROCEDURE — C1713 ANCHOR/SCREW BN/BN,TIS/BN: HCPCS | Performed by: ORTHOPAEDIC SURGERY

## 2021-04-15 PROCEDURE — 6360000002 HC RX W HCPCS: Performed by: ORTHOPAEDIC SURGERY

## 2021-04-15 PROCEDURE — 29828 SHO ARTHRS SRG BICP TENODSIS: CPT | Performed by: NURSE PRACTITIONER

## 2021-04-15 PROCEDURE — 3600000014 HC SURGERY LEVEL 4 ADDTL 15MIN: Performed by: ORTHOPAEDIC SURGERY

## 2021-04-15 PROCEDURE — 2780000010 HC IMPLANT OTHER: Performed by: ORTHOPAEDIC SURGERY

## 2021-04-15 PROCEDURE — 29828 SHO ARTHRS SRG BICP TENODSIS: CPT | Performed by: ORTHOPAEDIC SURGERY

## 2021-04-15 PROCEDURE — 29826 SHO ARTHRS SRG DECOMPRESSION: CPT | Performed by: NURSE PRACTITIONER

## 2021-04-15 PROCEDURE — 2500000003 HC RX 250 WO HCPCS: Performed by: ORTHOPAEDIC SURGERY

## 2021-04-15 PROCEDURE — 7100000001 HC PACU RECOVERY - ADDTL 15 MIN: Performed by: ORTHOPAEDIC SURGERY

## 2021-04-15 PROCEDURE — 29827 SHO ARTHRS SRG RT8TR CUF RPR: CPT | Performed by: ORTHOPAEDIC SURGERY

## 2021-04-15 PROCEDURE — 29827 SHO ARTHRS SRG RT8TR CUF RPR: CPT | Performed by: NURSE PRACTITIONER

## 2021-04-15 PROCEDURE — 3700000000 HC ANESTHESIA ATTENDED CARE: Performed by: ORTHOPAEDIC SURGERY

## 2021-04-15 PROCEDURE — 6360000002 HC RX W HCPCS: Performed by: NURSE PRACTITIONER

## 2021-04-15 PROCEDURE — 6370000000 HC RX 637 (ALT 250 FOR IP): Performed by: ANESTHESIOLOGY

## 2021-04-15 PROCEDURE — 7100000010 HC PHASE II RECOVERY - FIRST 15 MIN: Performed by: ORTHOPAEDIC SURGERY

## 2021-04-15 DEVICE — BONE ANCHORS 3 WITH ARTHROSCOPIC DELIVERY SYSTEM ADVANCED
Type: IMPLANTABLE DEVICE | Site: SHOULDER | Status: FUNCTIONAL
Brand: BONE ANCHORS WITH ARTHROSCOPIC DELIVERY SYSTEM - ADVANCED

## 2021-04-15 DEVICE — IMPLANTABLE DEVICE
Type: IMPLANTABLE DEVICE | Site: SHOULDER | Status: FUNCTIONAL
Brand: BIOINDUCTIVE IMPLANT WITH ARTHROSCOPIC DELIVERY SYSTEM - MEDIUM

## 2021-04-15 DEVICE — ANCHOR TEND 8 FOR REGENETEN BIOINDUCTIVE IMPL SYS: Type: IMPLANTABLE DEVICE | Site: SHOULDER | Status: FUNCTIONAL

## 2021-04-15 DEVICE — ANCHOR SUTURE BIOCOMP 4.75X22 MM DBL LD WHT SWIVELOCK C: Type: IMPLANTABLE DEVICE | Site: SHOULDER | Status: FUNCTIONAL

## 2021-04-15 RX ORDER — FENTANYL CITRATE 50 UG/ML
INJECTION, SOLUTION INTRAMUSCULAR; INTRAVENOUS
Status: COMPLETED
Start: 2021-04-15 | End: 2021-04-15

## 2021-04-15 RX ORDER — SODIUM CHLORIDE 9 MG/ML
INJECTION, SOLUTION INTRAVENOUS CONTINUOUS PRN
Status: DISCONTINUED | OUTPATIENT
Start: 2021-04-15 | End: 2021-04-15 | Stop reason: SDUPTHER

## 2021-04-15 RX ORDER — SODIUM CHLORIDE 9 MG/ML
25 INJECTION, SOLUTION INTRAVENOUS PRN
Status: DISCONTINUED | OUTPATIENT
Start: 2021-04-15 | End: 2021-04-15 | Stop reason: HOSPADM

## 2021-04-15 RX ORDER — SODIUM CHLORIDE 0.9 % (FLUSH) 0.9 %
10 SYRINGE (ML) INJECTION EVERY 12 HOURS SCHEDULED
Status: DISCONTINUED | OUTPATIENT
Start: 2021-04-15 | End: 2021-04-15 | Stop reason: HOSPADM

## 2021-04-15 RX ORDER — EPINEPHRINE 1 MG/ML
INJECTION, SOLUTION, CONCENTRATE INTRAVENOUS PRN
Status: DISCONTINUED | OUTPATIENT
Start: 2021-04-15 | End: 2021-04-15 | Stop reason: ALTCHOICE

## 2021-04-15 RX ORDER — PROPOFOL 10 MG/ML
INJECTION, EMULSION INTRAVENOUS PRN
Status: DISCONTINUED | OUTPATIENT
Start: 2021-04-15 | End: 2021-04-15 | Stop reason: SDUPTHER

## 2021-04-15 RX ORDER — SODIUM CHLORIDE 0.9 % (FLUSH) 0.9 %
10 SYRINGE (ML) INJECTION PRN
Status: DISCONTINUED | OUTPATIENT
Start: 2021-04-15 | End: 2021-04-15 | Stop reason: HOSPADM

## 2021-04-15 RX ORDER — MIDAZOLAM HYDROCHLORIDE 1 MG/ML
INJECTION INTRAMUSCULAR; INTRAVENOUS
Status: COMPLETED
Start: 2021-04-15 | End: 2021-04-15

## 2021-04-15 RX ORDER — ROPIVACAINE HYDROCHLORIDE 5 MG/ML
INJECTION, SOLUTION EPIDURAL; INFILTRATION; PERINEURAL
Status: COMPLETED | OUTPATIENT
Start: 2021-04-15 | End: 2021-04-15

## 2021-04-15 RX ORDER — LIDOCAINE HYDROCHLORIDE 20 MG/ML
INJECTION, SOLUTION EPIDURAL; INFILTRATION; INTRACAUDAL; PERINEURAL PRN
Status: DISCONTINUED | OUTPATIENT
Start: 2021-04-15 | End: 2021-04-15 | Stop reason: SDUPTHER

## 2021-04-15 RX ORDER — ONDANSETRON 2 MG/ML
INJECTION INTRAMUSCULAR; INTRAVENOUS PRN
Status: DISCONTINUED | OUTPATIENT
Start: 2021-04-15 | End: 2021-04-15 | Stop reason: SDUPTHER

## 2021-04-15 RX ORDER — ROCURONIUM BROMIDE 10 MG/ML
INJECTION, SOLUTION INTRAVENOUS PRN
Status: DISCONTINUED | OUTPATIENT
Start: 2021-04-15 | End: 2021-04-15 | Stop reason: SDUPTHER

## 2021-04-15 RX ORDER — FENTANYL CITRATE 50 UG/ML
25 INJECTION, SOLUTION INTRAMUSCULAR; INTRAVENOUS EVERY 5 MIN PRN
Status: DISCONTINUED | OUTPATIENT
Start: 2021-04-15 | End: 2021-04-15 | Stop reason: HOSPADM

## 2021-04-15 RX ORDER — HYDROCODONE BITARTRATE AND ACETAMINOPHEN 5; 325 MG/1; MG/1
1 TABLET ORAL EVERY 6 HOURS PRN
Qty: 28 TABLET | Refills: 0 | Status: SHIPPED | OUTPATIENT
Start: 2021-04-15 | End: 2021-04-22

## 2021-04-15 RX ORDER — ROPIVACAINE HYDROCHLORIDE 5 MG/ML
INJECTION, SOLUTION EPIDURAL; INFILTRATION; PERINEURAL
Status: COMPLETED
Start: 2021-04-15 | End: 2021-04-15

## 2021-04-15 RX ORDER — GLYCOPYRROLATE 1 MG/5 ML
SYRINGE (ML) INTRAVENOUS PRN
Status: DISCONTINUED | OUTPATIENT
Start: 2021-04-15 | End: 2021-04-15 | Stop reason: SDUPTHER

## 2021-04-15 RX ORDER — IPRATROPIUM BROMIDE AND ALBUTEROL SULFATE 2.5; .5 MG/3ML; MG/3ML
1 SOLUTION RESPIRATORY (INHALATION)
Status: DISCONTINUED | OUTPATIENT
Start: 2021-04-15 | End: 2021-04-15 | Stop reason: HOSPADM

## 2021-04-15 RX ORDER — KETOROLAC TROMETHAMINE 10 MG/1
10 TABLET, FILM COATED ORAL EVERY 6 HOURS
Qty: 8 TABLET | Refills: 0 | Status: SHIPPED | OUTPATIENT
Start: 2021-04-15 | End: 2022-05-05 | Stop reason: ALTCHOICE

## 2021-04-15 RX ORDER — DEXAMETHASONE SODIUM PHOSPHATE 4 MG/ML
INJECTION, SOLUTION INTRA-ARTICULAR; INTRALESIONAL; INTRAMUSCULAR; INTRAVENOUS; SOFT TISSUE PRN
Status: DISCONTINUED | OUTPATIENT
Start: 2021-04-15 | End: 2021-04-15 | Stop reason: SDUPTHER

## 2021-04-15 RX ORDER — ROPIVACAINE HYDROCHLORIDE 5 MG/ML
30 INJECTION, SOLUTION EPIDURAL; INFILTRATION; PERINEURAL ONCE
Status: COMPLETED | OUTPATIENT
Start: 2021-04-15 | End: 2021-04-15

## 2021-04-15 RX ORDER — HYDROCODONE BITARTRATE AND ACETAMINOPHEN 5; 325 MG/1; MG/1
1 TABLET ORAL
Status: DISCONTINUED | OUTPATIENT
Start: 2021-04-15 | End: 2021-04-15 | Stop reason: HOSPADM

## 2021-04-15 RX ORDER — MIDAZOLAM HYDROCHLORIDE 2 MG/2ML
1 INJECTION, SOLUTION INTRAMUSCULAR; INTRAVENOUS EVERY 5 MIN PRN
Status: DISCONTINUED | OUTPATIENT
Start: 2021-04-15 | End: 2021-04-15 | Stop reason: HOSPADM

## 2021-04-15 RX ORDER — FENTANYL CITRATE 50 UG/ML
25 INJECTION, SOLUTION INTRAMUSCULAR; INTRAVENOUS PRN
Status: DISCONTINUED | OUTPATIENT
Start: 2021-04-15 | End: 2021-04-15 | Stop reason: HOSPADM

## 2021-04-15 RX ORDER — NEOSTIGMINE METHYLSULFATE 1 MG/ML
INJECTION, SOLUTION INTRAVENOUS PRN
Status: DISCONTINUED | OUTPATIENT
Start: 2021-04-15 | End: 2021-04-15 | Stop reason: SDUPTHER

## 2021-04-15 RX ADMIN — PROPOFOL 200 MG: 10 INJECTION, EMULSION INTRAVENOUS at 07:03

## 2021-04-15 RX ADMIN — LIDOCAINE HYDROCHLORIDE 100 MG: 20 INJECTION, SOLUTION EPIDURAL; INFILTRATION; INTRACAUDAL; PERINEURAL at 07:03

## 2021-04-15 RX ADMIN — IPRATROPIUM BROMIDE AND ALBUTEROL SULFATE 1 AMPULE: .5; 3 SOLUTION RESPIRATORY (INHALATION) at 09:17

## 2021-04-15 RX ADMIN — Medication 0.6 MG: at 08:28

## 2021-04-15 RX ADMIN — SODIUM CHLORIDE: 9 INJECTION, SOLUTION INTRAVENOUS at 06:55

## 2021-04-15 RX ADMIN — ROCURONIUM BROMIDE 40 MG: 10 INJECTION, SOLUTION INTRAVENOUS at 07:03

## 2021-04-15 RX ADMIN — ONDANSETRON 4 MG: 2 INJECTION INTRAMUSCULAR; INTRAVENOUS at 08:23

## 2021-04-15 RX ADMIN — FENTANYL CITRATE 100 MCG: 50 INJECTION, SOLUTION INTRAMUSCULAR; INTRAVENOUS at 06:35

## 2021-04-15 RX ADMIN — Medication 2000 MG: at 07:00

## 2021-04-15 RX ADMIN — Medication 3 MG: at 08:29

## 2021-04-15 RX ADMIN — ROPIVACAINE HYDROCHLORIDE 30 ML: 5 INJECTION, SOLUTION EPIDURAL; INFILTRATION; PERINEURAL at 06:43

## 2021-04-15 RX ADMIN — ROCURONIUM BROMIDE 10 MG: 10 INJECTION, SOLUTION INTRAVENOUS at 07:20

## 2021-04-15 RX ADMIN — MIDAZOLAM HYDROCHLORIDE 2 MG: 2 INJECTION, SOLUTION INTRAMUSCULAR; INTRAVENOUS at 06:43

## 2021-04-15 RX ADMIN — DEXAMETHASONE SODIUM PHOSPHATE 10 MG: 4 INJECTION, SOLUTION INTRAMUSCULAR; INTRAVENOUS at 07:10

## 2021-04-15 RX ADMIN — MIDAZOLAM HYDROCHLORIDE 2 MG: 1 INJECTION, SOLUTION INTRAMUSCULAR; INTRAVENOUS at 06:43

## 2021-04-15 ASSESSMENT — PULMONARY FUNCTION TESTS
PIF_VALUE: 24
PIF_VALUE: 20
PIF_VALUE: 23
PIF_VALUE: 24
PIF_VALUE: 24
PIF_VALUE: 20
PIF_VALUE: 23
PIF_VALUE: 24
PIF_VALUE: 24
PIF_VALUE: 23
PIF_VALUE: 24
PIF_VALUE: 20
PIF_VALUE: 24
PIF_VALUE: 18
PIF_VALUE: 9
PIF_VALUE: 21
PIF_VALUE: 24
PIF_VALUE: 4
PIF_VALUE: 24
PIF_VALUE: 23
PIF_VALUE: 24
PIF_VALUE: 24
PIF_VALUE: 7
PIF_VALUE: 2
PIF_VALUE: 24
PIF_VALUE: 25
PIF_VALUE: 18
PIF_VALUE: 24
PIF_VALUE: 8
PIF_VALUE: 25
PIF_VALUE: 13
PIF_VALUE: 24
PIF_VALUE: 24
PIF_VALUE: 22
PIF_VALUE: 24
PIF_VALUE: 20
PIF_VALUE: 24
PIF_VALUE: 24

## 2021-04-15 ASSESSMENT — PAIN - FUNCTIONAL ASSESSMENT
PAIN_FUNCTIONAL_ASSESSMENT: 0-10
PAIN_FUNCTIONAL_ASSESSMENT: 0-10

## 2021-04-15 ASSESSMENT — PAIN SCALES - GENERAL
PAINLEVEL_OUTOF10: 0

## 2021-04-15 ASSESSMENT — LIFESTYLE VARIABLES: SMOKING_STATUS: 1

## 2021-04-15 NOTE — PROGRESS NOTES
3500 admitted to  4 iv cont pt very restless, c/o cant breath anesthesia at bedside, resp called for treatment. oral airway removed   0911 resp called again for breathing treatment

## 2021-04-15 NOTE — OP NOTE
OPERATIVE REPORT    PATIENT:  Ely Toledo  [id number]    DATE OF PROCEDURE:  4/15/21    SURGEON: Braden Valdez MD    ASSISTANT:  Mable Mcgee CNP. No qualified resident available to assist.  CNP was necessary for positioning, prepping/draping, intra-operative assistance, and wound closure. His assistance expedited the procedure and decreased operating room time. PREOPERATIVE DIAGNOSIS: Biceps subluxation, rotator cuff tear   Right shoulder. POSTOPERATIVE DIAGNOSIS: Biceps subluxation with tear, rotator cuff tear (upper subscapularis, partial supraspinatus >50%)  Right shoulder. OPERATION:  Right shoulder arthroscopy, subacromial decompression with acromioplasty, biceps tenodesis, subscapularis repair, supraspinatus repair with Regeneten patch    ANESTHESIA: . general and interscalene block    OPERATIVE INDICATIONS:  The patient is a 52 y.o. male with significant past medical history of right shoulder pain, mechanical symptoms, and weakness. Patient reported onset of symptoms about 1 year ago while playing sports he felt a pop in his shoulder. Patient has had significant mechanical symptoms and inability to elevate his arm without maneuvering. He has failed conservative treatment including physical therapy which has made symptoms worse. He underwent an MRI that showed probable biceps subluxation, probable partial-thickness tearing of the anterior aspect of the supraspinatus. Symptoms continue to affect his activities of daily living as well as his job for this reason he wishes to proceed with surgical intervention. Surgery would involve a right shoulder arthroscopy rotator cuff repair versus debridement possible regeneten patch augmentation, possible biceps tenotomy versus tenodesis. We discussed the risks of surgery today.  These risks include but are not limited to infection, neurovascular injury, irreparable rotator cuff repair requiring debridement, failure of repair due to poor tissue quality, need for revision surgery or future surgeries down the road, as well as the risks of general anesthesia. Patient verbalizes understanding of the risks and wishes to proceed. OPERATIVE PROCEDURE: After satisfactory general anesthesia, as well as scalene block, the patient was placed supine on the operative table on a bean bag. The shoulder was examined under anesthesia and range of motion was noted to be 180 degrees forward elevation, and with the shoulder abducted 90 degrees, 90 external and 90 internal rotation. There was not increased translation with anterior/posterior load and shift. The patient was then turned into the lateral position with operative shoulder up. A bean bag was inflated to secure her in this position, and all bony prominences were well padded. An axillary roll was placed. The arm was cleaned with alcohol and axilla shaved. The operative extremity was then prepped and draped in sterile fashion. Prior to procedure start, a time out was performed including confirmation of operative site and operation to be performed. Antibiotic prophylaxis, 2 g  Ancef was given prior to incision. The borders of the scapula and clavicle were marked with a marking pen as were planned portal sites. An 18 gauge spinal needle on a syringe of local anesthetic was inserted at the posterior portal site, into the joint, and aspiration was performed, showing yellowish joint fluid, confirming intra-articular placement. Local anesthetic was injected as the needle was withdrawn. An 11 blade was utilized to make a skin incision for planned posterior portal, which was followed by the scope trocar, and arthroscope. Diagnostic arthroscopy ensued. We noted tearing the biceps tendon nearly 50% of the tendon as it was abrading the humeral head. There was split tearing of the upper border the subscapularis. There was some articular sided fraying of the supraspinatus.   Articular cartilage of humeral head and glenoid was intact. Posterior labrum was intact. Next, an anterior portal was established through the rotator interval between the subscapularis and biceps in the anterosuperolateral position. An Arthrex canula was inserted. We debrided the partial tear of the biceps tendon noting it was about 50%. We determined at this point biceps tenodesis was appropriate. We came in and passed a loop intact suture locking stitch around the tendon then amputated from the labrum. We felt that we could place this down with our anchor for our subscap repair as the 2 lined up nicely where the biceps exited the shoulder. We proceeded to pass an inverted mattress suture with fiber tape through the upper border the subscapularis. This was placed together with the biceps suture through a 4.75 mm swivel lock anchor. Tuberosity was debrided down to bone and then we inserted the tap followed by the anchor which repaired the upper subscapularis and created a biceps tenodesis which was low-profile knotless. This was stable to probing and range of motion afterwards. Next, the arthrscope was withdrawn, trocar placed, and subacromial space entered. There was a fair amount of bursitis. The subacromial space was very tight and there was downsloping of the anterior acromion. We felt was appropriate to perform acromioplasty to open up the space and remove the downsloping spur at the anterior aspect. This was formed using a 5 mm bur, removing about 3 to 4 mm of bone. We noted significant bursal sided fraying of the supraspinatus nearly 50% of the tendon where it did feel to be fairly thin at this point. We felt the Regeneten patch was appropriate. This was placed through our lateral portal, medium-size patch and deployed. We utilized PDS staples followed by peak bone staples into the tuberosity laterally.   The patch was laying nicely on the supraspinatus bridging the tendon and footprint on the bone.  It was stable to probing and range of motion. The camera was returned to the joint through the posterior portal.  Final pictures were taken. The scope was withdrawn and fluid removed via suction. The wounds were closed with buried 4-O moncryl. The wounds were covered with steri strips, xeroform, 4x4, and tape. The shoulder was placed in a sling. The patient was awoken from anesthesia and transferred to the recovery room in stable condition. IMPLANTS:   Implant Name Type Inv. Item Serial No.  Lot No. LRB No. Used Action   ANCHOR SUTURE BIOCOMP 4.75X22 MM DBL LD WHT [employer]EL[employer] C  ANCHOR SUTURE BIOCOMP 4.75X22 MM DBL LD WHT [employer] [facility]- [id number] Right 1 Implanted   IMPLANT BIOINDUCTIVE M BOV ACHILLES TEND W/ ARTHSCP DEL SYS  IMPLANT BIOINDUCTIVE M BOV ACHILLES TEND W/ ARTHSCP DEL SYS  [employer]-  Right 1 Implanted   ANCHOR BONE 3 W/DEL SYS Fastener ANCHOR BONE 3 W/DEL SYS  [employer]-PMM [id number] Right 1 Implanted   ANCHOR TEND 8 FOR REGENETEN BIOINDUCTIVE IMPL SYS  ANCHOR TEND 8 FOR REGENETEN BIOINDUCTIVE IMPL SYS  [employer]ASC[employer]IA [employer]PHEW- [id number] Right 1 Implanted         ESTIMATED BLOOD LOSS:  5 mL    COMPLICATIONS: none    POST OPERATIVE PLAN: The patient will discharged home from PACU once stable. Follow up in office will be post operative day 1 or 2. Dressing will stay on and ice applied until follow up. The patient will remain in the sling.         Migue Diop MD  Orthopaedic Surgery   4/15/21  8:42 AM

## 2021-04-15 NOTE — H&P
Department of Orthopedic Surgery  Attending Pre-operative History and Physical        DIAGNOSIS: Right shoulder biceps subluxation, possible rotator cuff tear    INDICATION: Failed conservative treatment    PROCEDURE: Right shoulder arthroscopy rotator cuff repair versus debridement possible regeneten patch augmentation, possible biceps tenotomy versus tenodesis    CHIEF COMPLAINT: Right shoulder pain, weakness    History Obtained From:  patient, electronic medical record    HISTORY OF PRESENT ILLNESS:      The patient is a 52 y.o. male with significant past medical history of right shoulder pain, mechanical symptoms, and weakness. Patient reported onset of symptoms about 1 year ago while playing sports he felt a pop in his shoulder. Patient has had significant mechanical symptoms and inability to elevate his arm without maneuvering. He has failed conservative treatment including physical therapy which has made symptoms worse. He underwent an MRI that showed probable biceps subluxation, probable partial-thickness tearing of the anterior aspect of the supraspinatus. Symptoms continue to affect his activities of daily living as well as his job for this reason he wishes to proceed with surgical intervention. Surgery would involve a right shoulder arthroscopy rotator cuff repair versus debridement possible regeneten patch augmentation, possible biceps tenotomy versus tenodesis. We discussed the risks of surgery today. These risks include but are not limited to infection, neurovascular injury, irreparable rotator cuff repair requiring debridement, failure of repair due to poor tissue quality, need for revision surgery or future surgeries down the road, as well as the risks of general anesthesia. Patient verbalizes understanding of the risks and wishes to proceed.       Past Medical History:        Diagnosis Date    Asthma     COPD (chronic obstructive pulmonary disease) (HCC)     Depression     Hypertension  Shoulder pain     right for OR 4-15-21       Past Surgical History:    History reviewed. No pertinent surgical history. Medications Prior to Admission:   Medications Prior to Admission: amLODIPine (NORVASC) 5 MG tablet, take 1 tablet by mouth once daily  SYMBICORT 160-4.5 MCG/ACT AERO, inhale 2 puffs by mouth twice a day -MORNING AND EVENING  SPIRIVA RESPIMAT 1.25 MCG/ACT AERS inhaler, inhale 2 puffs by mouth and INTO THE LUNGS once daily  hydroCHLOROthiazide (MICROZIDE) 12.5 MG capsule, Take 12.5 mg by mouth daily  ibuprofen (IBU) 800 MG tablet, Take 1 tablet by mouth every 8 hours as needed for Pain Take with food. albuterol sulfate HFA (PROVENTIL HFA) 108 (90 BASE) MCG/ACT inhaler, Inhale 2 puffs into the lungs every 6 hours as needed for Wheezing  albuterol (PROVENTIL) (2.5 MG/3ML) 0.083% nebulizer solution, Take 2.5 mg by nebulization every 6 hours as needed for Wheezing    Allergies:  Nickel    History of allergic reaction to anesthesia:  No    Social History:   TOBACCO:   reports that he has been smoking cigarettes. He has a 10.00 pack-year smoking history. He has never used smokeless tobacco.  ETOH:   reports no history of alcohol use. DRUGS:   reports current drug use. Drug: Marijuana. Family History:   History reviewed. No pertinent family history.     REVIEW OF SYSTEMS:  CONSTITUTIONAL:  negative  RESPIRATORY:  negative  CARDIOVASCULAR:  negative  MUSCULOSKELETAL:  negative except for  HPI  NEUROLOGICAL:  negative    PHYSICAL EXAM:     VITALS:  /85   Pulse 66   Temp 97.9 °F (36.6 °C) (Temporal)   Resp 18   Ht 5' 7\" (1.702 m)   Wt 200 lb (90.7 kg)   SpO2 95%   BMI 31.32 kg/m²     Gen: AOx3, NAD    Heart:  normal apical pulses, normal S1 and S2 and no edema    Lungs:  No increased work of breathing, good air exchange     Abdomen:  no scars, non-distended and non-tender    Extremities:  No clubbing, cyanosis, or edema    Musculoskeletal: Right shoulder exam range of motion 150 degrees of elevation with effort, at 90 degrees has significant rotational maneuvering needed to raise arm. Pain and weakness is present on Jobes exam.  Pain on speeds exam.  [place]'s test is equivocal.  Belly press exam is mildly positive for pain at end of range of motion. tenderness directly over biceps tendon      DATA:  CBC:   Lab Results   Component Value Date    WBC 9.6 07/31/2017    RBC 5.48 07/31/2017    HGB 16.3 07/31/2017    HCT 48.1 07/31/2017    MCV 87.8 07/31/2017    MCH 29.7 07/31/2017    MCHC 33.9 07/31/2017    RDW 12.2 07/31/2017     07/31/2017    MPV 9.9 07/31/2017     BMP:    Lab Results   Component Value Date     04/14/2021    K 4.1 04/14/2021     04/14/2021    CO2 27 04/14/2021    BUN 13 04/14/2021    LABALBU 4.5 07/31/2017    CREATININE 0.8 04/14/2021    CALCIUM 9.5 04/14/2021    GFRAA >60 04/14/2021    LABGLOM >60 04/14/2021    GLUCOSE 114 04/14/2021       Radiology Review: X-ray of the right shoulder showed no acute abnormality. MRI shows probable biceps dislocation/subluxation but with poor visualization as it leaves the groove and enters the shoulder. Abnormal abnormality visualized to the anterior labrum. High-grade partial near full-thickness tearing of the insertion of the supraspinatus    ASSESSMENT AND PLAN:    1. Patient is a 52 y.o. male with above specified procedure planned right shoulder arthroscopy rotator cuff repair versus debridement possible regeneten patch augmentation possible biceps tenotomy versus tenodesis with anesthesia    2. Procedure options, risks and benefits reviewed with patient. Patient expresses understanding and has signed consent form to proceed.     3.  Patient and family were provided with pre-op and post-op instructions, prescription medications, and any other supplied needed post op (slings, braces, etc.)    Controlled substances monitoring: possible medication side effects, risk of tolerance and/or dependence, and alternative treatments discussed, no signs of potential drug abuse or diversion identified and OARRS report reviewed today- activity consistent with treatment plan.         James Granados CNP  Orthopaedic Surgery   4/15/21  6:42 AM

## 2021-04-15 NOTE — ANESTHESIA PROCEDURE NOTES
Peripheral Block    Patient location during procedure: PACU  Start time: 4/15/2021 6:45 AM  End time: 4/15/2021 6:55 AM  Staffing  Performed: anesthesiologist and other anesthesia staff   Anesthesiologist: Elis Davis DO  Other anesthesia staff: Jodee Saba RN  Preanesthetic Checklist  Completed: patient identified, IV checked, site marked, risks and benefits discussed, surgical consent, monitors and equipment checked, pre-op evaluation, timeout performed, anesthesia consent given, oxygen available and patient being monitored  Peripheral Block  Patient position: supine  Prep: ChloraPrep  Patient monitoring: cardiac monitor, continuous pulse ox, frequent blood pressure checks and IV access  Block type: Brachial plexus  Laterality: right  Injection technique: single-shot  Guidance: ultrasound guided  Local infiltration: ropivacaine  Interscalene  Provider prep: sterile gloves  Local infiltration: ropivacaine  Needle  Needle type: combined needle/nerve stimulator   Needle gauge: 20 G  Needle length: 2cm.   Needle localization: anatomical landmarks and ultrasound guidance  Assessment  Injection assessment: negative aspiration for heme, no paresthesia on injection and local visualized surrounding nerve on ultrasound  Paresthesia pain: none  Slow fractionated injection: yes  Hemodynamics: stable  Medications Administered  Ropivacaine (NAROPIN) injection 0.5%, 30 mL  Reason for block: post-op pain management and at surgeon's request

## 2021-04-15 NOTE — PROGRESS NOTES
CLINICAL PHARMACY NOTE: MEDS TO [address] [place] [address] Drive Select Patient?: No  Total # of Prescriptions Filled: 2   The following medications were delivered to the patient:  · Ketorolac 10 mg  · norco 5-325 mg  Total # of Interventions Completed: 7  Time Spent (min): 60    Additional Documentation:

## 2021-04-15 NOTE — PROGRESS NOTES
Family at bedside. Call light within reach. Nourishment provided. Assisted up to chair with good toleration.

## 2021-04-15 NOTE — ANESTHESIA POSTPROCEDURE EVALUATION
Department of Anesthesiology  Postprocedure Note    Patient: Mark Willis MRN: [id number]  YOB: 1970  Date of evaluation: 4/15/2021  Time:  12:24 PM     Procedure Summary     Date: 04/15/21 Room / Location: [place] [address] / [address] [place] [address]    Anesthesia Start: 1619 Anesthesia Stop: 9541    Procedure: RIGHT SHOULDER ARTHROSCOPY ROTATOR CUFF REPAIR  REGENETEN IMPLANT SUBACROMIAL DECOMPRESSION, BICEP TENODESIS (Right Shoulder) Diagnosis: (RIGHT ROTATOR CUFF TEAR)    Surgeons: Josefina Villegas MD Responsible Provider: Candice Warren DO    Anesthesia Type: general ASA Status: 3          Anesthesia Type: general    Silvano Phase I: Silvano Score: 10    Silvano Phase II: Silvano Score: 10    Last vitals: Reviewed and per EMR flowsheets.        Anesthesia Post Evaluation    Patient location during evaluation: PACU  Patient participation: complete - patient participated  Level of consciousness: awake and alert  Airway patency: patent  Nausea & Vomiting: no nausea and no vomiting  Complications: no  Cardiovascular status: hemodynamically stable  Respiratory status: acceptable  Hydration status: euvolemic

## 2021-04-15 NOTE — ANESTHESIA PRE PROCEDURE
07/31/2017       POC Tests: No results for input(s): POCGLU, POCNA, POCK, POCCL, POCBUN, POCHEMO, POCHCT in the last 72 hours. Coags: No results found for: PROTIME, INR, APTT    HCG (If Applicable): No results found for: PREGTESTUR, PREGSERUM, HCG, HCGQUANT     ABGs: No results found for: PHART, PO2ART, GRD0KEV, LWM1VAL, BEART, J2UOHELS     Type & Screen (If Applicable):  No results found for: LABABO, LABRH    Drug/Infectious Status (If Applicable):  No results found for: HIV, HEPCAB    COVID-19 Screening (If Applicable):   Lab Results   Component Value Date    COVID19 Not Detected 04/09/2021     NM: 3/31/2021        1. The myocardial perfusion is abnormal.   2. Reversible defect in the inferior wall extending to the apical   inferior and apical lateral wall suggestive of possible ischemia. 3. No fixed defects to suggest prior myocardial infarction. 4. Normal systolic function, EF 10% with apical hypokinesis. 5. There is no transient ischemic dilatation. 6. Intermediate risk myocardial perfusion study. CXR: 4/12/2021  FINDINGS:   The lungs are without acute focal process.  There is no effusion or   pneumothorax. The cardiomediastinal silhouette is without acute process. The   osseous structures are without acute process. Anesthesia Evaluation  Patient summary reviewed and Nursing notes reviewed no history of anesthetic complications:   Airway: Mallampati: III  TM distance: >3 FB   Neck ROM: full  Mouth opening: > = 3 FB Dental:      Comment: Denies any chipped or loose teeth    Pulmonary: breath sounds clear to auscultation  (+) COPD:  asthma: current smoker          Patient did not smoke on day of surgery.                  Cardiovascular:  Exercise tolerance: poor (<4 METS),   (+) hypertension:,         Rhythm: regular  Rate: normal           Beta Blocker:  Not on Beta Blocker         Neuro/Psych:   (+) depression/anxiety             GI/Hepatic/Renal: Neg GI/Hepatic/Renal ROS Endo/Other:                      ROS comment: Scheduled for 4/15/2021:    RIGHT SHOULDER ARTHROSCOPY ROTATOR CUFF REPAIR VS DEBRIDEMENT POSSIBLE REGENETEN PATCH POSSIBLE BICEP TENOTOMY VS TENODESIS ++ARTHREX++ ++PLASCENCIA AND NEPHEW++ ++PNB++ (Right ) Abdominal:           Vascular: negative vascular ROS. Anesthesia Plan      general     ASA 3     (#20 Left hand)  Induction: intravenous. MIPS: Postoperative opioids intended and Prophylactic antiemetics administered. Anesthetic plan and risks discussed with patient. Use of blood products discussed with patient whom consented to blood products. Plan discussed with attending.                 Poonam Porras RN   4/15/2021

## 2021-04-16 ENCOUNTER — OFFICE VISIT (OUTPATIENT)
Dept: ORTHOPEDIC SURGERY | Age: 51
End: 2021-04-16

## 2021-04-16 VITALS — WEIGHT: 200 LBS | BODY MASS INDEX: 31.39 KG/M2 | HEIGHT: 67 IN

## 2021-04-16 DIAGNOSIS — Z98.890 STATUS POST ARTHROSCOPY OF RIGHT SHOULDER: ICD-10-CM

## 2021-04-16 DIAGNOSIS — Z98.890 STATUS POST ARTHROSCOPY OF RIGHT SHOULDER: Primary | ICD-10-CM

## 2021-04-16 PROCEDURE — 99024 POSTOP FOLLOW-UP VISIT: CPT | Performed by: ORTHOPAEDIC SURGERY

## 2021-05-28 ENCOUNTER — TELEPHONE (OUTPATIENT)
Dept: ORTHOPEDIC SURGERY | Age: 51
End: 2021-05-28

## 2021-06-14 ENCOUNTER — OFFICE VISIT (OUTPATIENT)
Dept: ORTHOPEDIC SURGERY | Age: 51
End: 2021-06-14

## 2021-06-14 VITALS — WEIGHT: 200 LBS | HEIGHT: 67 IN | BODY MASS INDEX: 31.39 KG/M2

## 2021-06-14 DIAGNOSIS — Z98.890 STATUS POST ARTHROSCOPY OF RIGHT SHOULDER: Primary | ICD-10-CM

## 2021-06-14 PROCEDURE — 99024 POSTOP FOLLOW-UP VISIT: CPT | Performed by: ORTHOPAEDIC SURGERY

## 2021-06-14 NOTE — PROGRESS NOTES
Follow Up Post Operative Visit     Surgery:  Right shoulder arthroscopy, subacromial decompression with acromioplasty, biceps tenodesis, subscapularis repair, supraspinatus repair with Regeneten patch    Date: 4/15/2021    Subjective:    Sean Aguilar is here for follow up visit s/p above procedure. He is doing very well. He has been for the most part compliant. Controlled Substances Monitoring:        Physical Exam:    Height: 5' 7\" (1.702 m), Weight: 200 lb (90.7 kg)    General: Alert and oriented x3, no acute distress  Cardiovascular/pulmonary: No labored breathing, peripheral perfusion intact  Musculoskeletal:    Exam of the shoulder shows range of motion 160/45/T8. He has good strength with Onofre test.  Good strength with Speed and Tyrone's test.  Belly press test is intact. Imaging: No new images. Previous images reviewed including intraoperative photos    Assessment and Plan: Status post right shoulder rotator cuff repair and biceps tenodesis now about 2 months out  He is doing very well. He will start physical therapy progress with range of motion and can begin working on strengthening. We will see him back in 2 months to reassess.     Padmini Barksdale MD  Orthopaedic Surgery   6/14/21  2:30 PM

## 2021-06-15 NOTE — ADDENDUM NOTE
Addended Francisca Lopez on: 6/15/2021 03:29 PM     Modules accepted: Level of Service
Additional Safety/Bands:

## 2021-06-23 ENCOUNTER — EVALUATION (OUTPATIENT)
Dept: PHYSICAL THERAPY | Age: 51
End: 2021-06-23
Payer: MEDICAID

## 2021-06-23 DIAGNOSIS — Z98.890 S/P RIGHT ROTATOR CUFF REPAIR: Primary | ICD-10-CM

## 2021-06-23 PROCEDURE — 97112 NEUROMUSCULAR REEDUCATION: CPT | Performed by: PHYSICAL THERAPIST

## 2021-06-23 PROCEDURE — 97161 PT EVAL LOW COMPLEX 20 MIN: CPT | Performed by: PHYSICAL THERAPIST

## 2021-06-23 PROCEDURE — 97110 THERAPEUTIC EXERCISES: CPT | Performed by: PHYSICAL THERAPIST

## 2021-06-23 NOTE — PROGRESS NOTES
[address] [facility]   Phone: [phone]   Fax: [phone]      Physical Therapy Daily Treatment Note    Date: 2021  Patient Name: Paula Gilbert : 1970   MRN: [id number]  DOInjury: 4/15/2021  DOSx: 4/15/2021   Referring Provider: Linda Person MD  [address] [place] [address]     Medical Diagnosis:     PREOPERATIVE DIAGNOSIS: Biceps subluxation, rotator cuff tear   Right shoulder.      POSTOPERATIVE DIAGNOSIS: Biceps subluxation with tear, rotator cuff tear (upper subscapularis, partial supraspinatus >50%)  Right shoulder.      OPERATION:  Right shoulder arthroscopy, subacromial decompression with acromioplasty, biceps tenodesis, subscapularis repair, supraspinatus repair with Regeneten patch    Outcome Measure: QuickDASH  30% Disability    S: The pt c/o difficulty reaching away & overhead from his body. O: Please refer to JOYCE Brown 2021  Time 4829-2464     Visit  Repeat outcome measure at mid point and end. Pain      Strength      Palpation      ROM      Modalities            Manual                  Stretch      Table slides      Wall Flexion      Wall ER stretch      Towel IR stretch      IR reaching behind back      Exercise      Supine serratus anterior punch x30  TE   Supine sh flex x30  TE   Standing wand sh flex & abd x30 each  TE   Pulleys - flex/abd/scaption x30 each  TE   Prone sh flex \"I\" x30  NR   Side-lying sh ER x30  NR   Side-lying sh abd x30  NR   Side-lying sh horizontal abd x30  NR   Prone sh horizontal abd \"T\" x30  NR   Prone sh ext x30  NR   Prone scap row x30  NR   Standing wand flex      Standing flexion      Standing ABD            ROWS: H Functional activities  To aid in ROM and strength needed for reaching , lifting ,pushing and pulling at home/work    ROWS: M  \"    ROWS: L  \"    ER  \"    IR  \"                A:  Tolerated well. Above added to written HEP.     P: Continue with rehab plan    Soraya Rodriguez, PT [facility] [id number]    Treatment Charges: Mins Units   Initial Evaluation 15 1   Re-Evaluation     Ther Exercise         TE 10 1   Manual Therapy     MT     Ther Activities        TA     Gait Training          GT     Neuro Re-education NR 15 1   Modalities     Non-Billable Service Time     Other     Total Time/Units 40 3

## 2021-06-23 NOTE — PROGRESS NOTES
[address] [facility]   Phone: [phone]   Fax: [phone]           Date:  2021   Patient: Evaristo Ward. : 1970  MRN: [id number]  Referring Provider: Alvaro Benavides MD  [address] [facility]     Medical Diagnosis:     PREOPERATIVE DIAGNOSIS: Biceps subluxation, rotator cuff tear   Right shoulder.      POSTOPERATIVE DIAGNOSIS: Biceps subluxation with tear, rotator cuff tear (upper subscapularis, partial supraspinatus >50%)  Right shoulder.      OPERATION:  Right shoulder arthroscopy, subacromial decompression with acromioplasty, biceps tenodesis, subscapularis repair, supraspinatus repair with Regeneten patch     SUBJECTIVE:     Onset date: 4/15/2021    Surgery Date: 4/15/2021 PREOPERATIVE DIAGNOSIS: Biceps subluxation, rotator cuff tear   Right shoulder.      POSTOPERATIVE DIAGNOSIS: Biceps subluxation with tear, rotator cuff tear (upper subscapularis, partial supraspinatus >50%)  Right shoulder.      OPERATION:  Right shoulder arthroscopy, subacromial decompression with acromioplasty, biceps tenodesis, subscapularis repair, supraspinatus repair with Regeneten patch    Mechanism of Injury / History: The pt reports that he initially sustained injury to his right sh in May/2019. The pt has been unemployed (unable to work) since his right sh injury. Patient is right handed. Previous PT: yes - did not help and pt received PT prior to surgery    Medical Management for Current Problem: right rotator cuff repair 4/15/2021. Pt is performing HEP from outpt PT which was given to him prior to surgery.     Chief complaint: pain, decreased motion, weakness, inability / limited ability to use arm, limited ability to lift/carry/handle material, limited ability to complete home/outdoor chores/tasks    Behavior: condition is getting better    Pain: The pt presents with c/o post surgical pain & soreness at anterior right sh worsened with movement & rom of right sh.    Imaging results: No results found. Past Medical History:  Past Medical History:   Diagnosis Date    Asthma     COPD (chronic obstructive pulmonary disease) ([facility] [address]     Depression     Hypertension     Shoulder pain     right for OR 4-15-21     Past Surgical History:   Procedure Laterality Date    SHOULDER ARTHROSCOPY Right 4/15/2021    RIGHT SHOULDER ARTHROSCOPY ROTATOR CUFF REPAIR  REGENETEN IMPLANT SUBACROMIAL DECOMPRESSION, BICEP TENODESIS performed by Alfa Pabon MD at [facility] OR       Medications:   Current Outpatient Medications   Medication Sig Dispense Refill    ketorolac (TORADOL) 10 MG tablet Take 1 tablet by mouth every 6 hours for 2 days 8 tablet 0    amLODIPine (NORVASC) 5 MG tablet take 1 tablet by mouth once daily      SYMBICORT 160-4.5 MCG/ACT AERO inhale 2 puffs by mouth twice a day -MORNING AND EVENING      SPIRIVA RESPIMAT 1.25 MCG/ACT AERS inhaler inhale 2 puffs by mouth and INTO THE LUNGS once daily      hydroCHLOROthiazide (MICROZIDE) 12.5 MG capsule Take 12.5 mg by mouth daily      ibuprofen (IBU) 800 MG tablet Take 1 tablet by mouth every 8 hours as needed for Pain Take with food. (Patient not taking: Reported on 4/16/2021) 30 tablet 1    albuterol (PROVENTIL) (2.5 MG/3ML) 0.083% nebulizer solution Take 2.5 mg by nebulization every 6 hours as needed for Wheezing      albuterol sulfate HFA (PROVENTIL HFA) 108 (90 BASE) MCG/ACT inhaler Inhale 2 puffs into the lungs every 6 hours as needed for Wheezing 1 Inhaler 3     No current facility-administered medications for this visit. Occupation: does not work. The pt has been unable to work since his right sh injury May/2019. Exercise regimen: none. The pt is performing HEP provided by PT prior to his surgery 4/15/2021.     Hobbies: none    Patient Goals: pain relief, return to prior activity, full use of arm    Precautions/Contraindications: recent surgery, follow physician's protocol for rotator cuff repair. OBJECTIVE:     Observations: well nourished male    Inspection: surgical incisions well healed. Moderate forward/rounded sh.  Right sh hiking with arom flex & abd. .  I                 Palpation: the pt presents with no abnormal tenderness at right sh region. Joint/Motion:  Right Shoulder:  AROM: 95° Forward elevation,  85° abduction, 50° ER,  IR to 55  PROM: WFL for right sh flex, abd, ER, & IR    Strength: Not formally assessed due to physician's protocol  Right Shoulder:  3-/5 flex, abd, ER, & IR    Right Elbow: 3/5          ASSESSMENT     Outcome Measure: QuickDASH (Disorders of the Arm, Shoulder, and Hand) 30% disability    Problems:    Pain reported 4/10   ROM decreased   Strength decreased   Decreased functional ability with use of right upper extremity, reaching, lifting, carrying    Reason for Skilled Care: The pt presents with limited arom right sh, right sh weakness, functional limitations, right sh pain, & decreased quality of life due to diagnosis & undergoing recent right rotator cuff repair. Therefore, I recommend that the pt requires the skilled services of outpt PT Rehab to achieve LTGs, restore & resolve his deficits & limitations, & facilitate return to prior level of function/activity. [x] There are no barriers affecting plan of care or recovery    [] Barriers to this patient's plan of care or recovery include.     Domestic Concerns:  [x] No  [] Yes:    Short Term goals (3 weeks)   Decrease reported pain to 2/10   Increase ROM to 130-140 flex & abd   Increase Strength to 4/5 to 4+/5    Able to perform/complete the following functions/tasks: light household activities   QuickDASH (Disorders of the Arm, Shoulder, and Hand) 15% disability    Long Term goals (6 weeks)   Decrease reported pain to 0/10   Increase ROM to WNL   Increase Strength to 5/5    Able to perform/complete the following functions/tasks: heavy household activities   QuickDASH 0% disability   Independent with Home Exercise Programs    Rehab Potential: [x] Good  [] Fair  [] Poor    PLAN       Treatment Plan: 2x/wk x 6wks  [x] Therapeutic Exercise  [x] Therapeutic Activity  [x] Neuromuscular Re-education   [] Gait Training  [] Balance Training  [] Aerobic conditioning  [x] Manual Therapy  [] Massage/Fascial release   [] Work/Sport specific activities    [] Pain Neuroscience [x] Cold/hotpack  [x] Vasocompression  [] Electrical Stimulation  [] Lumbar/Cervical Traction  [x] Ultrasound   [] Iontophoresis: 4 mg/mL Dexamethasone Sodium Phosphate 40-80 mAmin  [] Dry Needling      [x] Instruction in HEP      []  Medication allergies reviewed for use of Dexamethasone Sodium Phosphate 4mg/ml  with iontophoresis treatments. Patient is not allergic. The following CPT codes are likely to be used in the care of this patient: 96013 PT Evaluation: Low Complexity , 61218 Therapeutic Exercise , 02905 Neuromuscular Re-Education , 06544 Therapeutic Activities , 81104 Manual Therapy , 75114 Vasopneumatic Device ,  Electrical Stimulation      Suggested Professional Referral: [x] No  [] Yes:     Patient Education:  [x] Plans/Goals, Risks/Benefits discussed  [x] Home exercise program  Method of Education: [x] Verbal  [x] Demo  [x] Written  Comprehension of Education:  [x] Verbalizes understanding. [x] Demonstrates understanding. [] Needs Review. [] Demonstrates/verbalizes understanding of HEP/Ed previously given. Frequency:  2 days per week for 6 weeks    Patient understands diagnosis/prognosis and consents to treatment, plan and goals: [x] Yes    [] No     Thank you for the opportunity to work with your patient. If you have questions or comments, please contact me at numbers listed above. Electronically signed by: Amy Zhu, [address] Medicare Patients Only     Please sign Physician's Certification and return to:   [address] [facility]  Dept: [phone]  Dept Fax: [phone] Certification / Comments     Frequency/Duration 2 days per week for 6 weeks. Certification period from 6/23/2021  to 8/23/2021. I have reviewed the Plan of Care established for skilled therapy services and certify that the services are required and that they will be provided while the patient is under my care.     Physician's Comments/Revisions:               Physician's Printed Name:                                           [de-identified] Signature:                                                               Date:

## 2021-07-15 ENCOUNTER — TREATMENT (OUTPATIENT)
Dept: PHYSICAL THERAPY | Age: 51
End: 2021-07-15
Payer: MEDICAID

## 2021-07-15 DIAGNOSIS — Z98.890 S/P RIGHT ROTATOR CUFF REPAIR: Primary | ICD-10-CM

## 2021-07-15 PROCEDURE — 97112 NEUROMUSCULAR REEDUCATION: CPT

## 2021-07-15 PROCEDURE — 97110 THERAPEUTIC EXERCISES: CPT

## 2021-07-15 NOTE — PROGRESS NOTES
[address] [facility]   Phone: [phone]   Fax: [phone]      Physical Therapy Daily Treatment Note    Date: 7/15/2021  Patient Name: Radha Deluca : 1970   MRN: [id number]  DOInjury: 4/15/2021  DOSx: 4/15/2021   Referring Provider: Kevin Arias MD  [address] [facility]     Medical Diagnosis:     PREOPERATIVE DIAGNOSIS: Biceps subluxation, rotator cuff tear   Right shoulder.      POSTOPERATIVE DIAGNOSIS: Biceps subluxation with tear, rotator cuff tear (upper subscapularis, partial supraspinatus >50%)  Right shoulder.      OPERATION:  Right shoulder arthroscopy, subacromial decompression with acromioplasty, biceps tenodesis, subscapularis repair, supraspinatus repair with Regeneten patch    Outcome Measure: QuickDASH  30% Disability    S: The pt reports pain this date 3/10, however states at home has had minimal pain overall. O:   Time 930-1010     Visit  Repeat outcome measure at mid point and end. Pain      Strength      Palpation      ROM      Modalities            Manual                  Stretch      Table slides flex, scap  x20 x 5s     Wall Flexion      Wall ER stretch 3 x 30sec     Towel IR stretch      IR reaching behind back      Exercise      Supine serratus anterior punch x30  TE   Supine sh flex   TE   Standing wand sh flex & abd and ext x30 each  TE   Pulleys - flex/abd/scaption x30 each  TE   Prone sh flex \"I\" x30  NR   Side-lying sh ER x30  NR   Side-lying sh abd x30  NR   Side-lying sh horizontal abd x30  NR   Prone sh horizontal abd \"T\" x30  NR   Prone sh ext x30  NR   Prone scap row   NR   Supine chest press wand X 30     Supine wand flexion X 30     Supine wand er/ir X 30     SB roll up wall.   2 x 10      Standing wand flex      Standing flexion      Standing ABD            ROWS: H Functional activities  To aid in ROM and strength needed for reaching , lifting ,pushing and pulling at home/work    ROWS: M  \"    ROWS: L  \" ER  \"    IR  \"                A:  Tolerated well. Added SB roll up this date and supine wand therex. Pt  Reports minor increase in pain following PT treatment. Pt also reports compliance with HEP.    P: Continue with rehab plan    Alexander Zabala PTA [id number]     Treatment Charges: Mins Units   Initial Evaluation     Re-Evaluation     Ther Exercise         TE 25 2   Manual Therapy     MT     Ther Activities        TA     Gait Training          GT     Neuro Re-education NR 15 1   Modalities     Non-Billable Service Time     Other     Total Time/Units 40 3

## 2021-07-22 ENCOUNTER — TREATMENT (OUTPATIENT)
Dept: PHYSICAL THERAPY | Age: 51
End: 2021-07-22
Payer: MEDICAID

## 2021-07-22 DIAGNOSIS — Z98.890 S/P RIGHT ROTATOR CUFF REPAIR: Primary | ICD-10-CM

## 2021-07-22 PROCEDURE — 97112 NEUROMUSCULAR REEDUCATION: CPT | Performed by: PHYSICAL THERAPIST

## 2021-07-22 PROCEDURE — 97110 THERAPEUTIC EXERCISES: CPT | Performed by: PHYSICAL THERAPIST

## 2021-07-22 NOTE — PROGRESS NOTES
[address] [facility]   Phone: [phone]   Fax: [phone]      Physical Therapy Daily Treatment Note    Date: 2021  Patient Name: Ginette Nails : 1970   MRN: [id number]  DOInjury: 4/15/2021  DOSx: 4/15/2021   Referring Provider: Yovana Hill MD  [address] [facility]     Medical Diagnosis:     PREOPERATIVE DIAGNOSIS: Biceps subluxation, rotator cuff tear   Right shoulder.      POSTOPERATIVE DIAGNOSIS: Biceps subluxation with tear, rotator cuff tear (upper subscapularis, partial supraspinatus >50%)  Right shoulder.      OPERATION:  Right shoulder arthroscopy, subacromial decompression with acromioplasty, biceps tenodesis, subscapularis repair, supraspinatus repair with Regeneten patch    Outcome Measure: QuickDASH  30% Disability    S: The pt c/o right sh soreness this morning due to sleeping in side-lying position. O:   Time 930-1015     Visit 3/8 Repeat outcome measure at mid point and end.     Pain      Strength      Palpation      ROM      Modalities            Manual                  Stretch      Table slides flex, scap  x20 x 5s     Wall Flexion      Wall ER stretch 3 x 30sec     Towel IR stretch      IR reaching behind back      Exercise      Supine serratus anterior punch x20 2lb DB TE   Supine sh flex x20 2lb DB TE   Pulleys - flex/abd/scaption x30 each  TE   Prone sh flex \"I\" x20 2lb DB NR   Side-lying sh ER x20 2lb DB NR   Side-lying sh abd x20 2lb DB NR   Side-lying sh horizontal abd x20 2lb DB NR   Prone sh horizontal abd \"T\" x20 2lb DB NR   Prone sh ext x20 2lb DB NR   Prone scap row x20 2lb DB NR   Prone sh Y x20 2lb DB NR   Band sh ext mid & high x20 each red NR   Band scap row high, mid, & low x20 each red NR   Band sh ER at side x20 red NR   ROWS: H Functional activities  To aid in ROM and strength needed for reaching , lifting ,pushing and pulling at home/work    Band reverse flys x20 red NR   Band sh ER at 90 abd x20 orange NR Band RUE lay pull-down x20 red NR   Band sit sh add to side x20 red NR               A:  Tolerated well. The pt progressed with today's Tx session to perform red elastic band & 2lb dumbbell resistive training.       P: Continue with rehab plan & progress    Inez Allen, PT [address]     Treatment Charges: Mins Units   Initial Evaluation     Re-Evaluation     Ther Exercise         TE 10 1   Manual Therapy     MT     Ther Activities        TA     Gait Training          GT     Neuro Re-education NR 35 2   Modalities     Non-Billable Service Time     Other     Total Time/Units 45 3

## 2021-08-02 ENCOUNTER — TREATMENT (OUTPATIENT)
Dept: PHYSICAL THERAPY | Age: 51
End: 2021-08-02
Payer: MEDICAID

## 2021-08-02 DIAGNOSIS — Z98.890 S/P RIGHT ROTATOR CUFF REPAIR: Primary | ICD-10-CM

## 2021-08-02 PROCEDURE — 97110 THERAPEUTIC EXERCISES: CPT

## 2021-08-02 PROCEDURE — 97112 NEUROMUSCULAR REEDUCATION: CPT

## 2021-08-02 NOTE — PROGRESS NOTES
[address] [facility] [facility]   Phone: [phone]   Fax: [phone]      Physical Therapy Daily Treatment Note    Date: 2021  Patient Name: Melissa Gardner : 1970   MRN: [id number]  DOInjury: 4/15/2021  DOSx: 4/15/2021   Referring Provider: Nuvia Rushing MD  [address] [place] [address]     Medical Diagnosis:     PREOPERATIVE DIAGNOSIS: Biceps subluxation, rotator cuff tear   Right shoulder.      POSTOPERATIVE DIAGNOSIS: Biceps subluxation with tear, rotator cuff tear (upper subscapularis, partial supraspinatus >50%)  Right shoulder.      OPERATION:  Right shoulder arthroscopy, subacromial decompression with acromioplasty, biceps tenodesis, subscapularis repair, supraspinatus repair with Regeneten patch    Outcome Measure: QuickDASH  30% Disability    S: Pt reports he is feeling good this day. Denies any c/o upon arrival.    O:   Time 920-1000     Visit  Repeat outcome measure at mid point and end. Pain      Strength      Palpation      ROM      Modalities            Manual                  Stretch           TE   Towel IR stretch 10s x 10  RUE TE   Exercise            Supine serratus anterior punch x20 2lb DB TE   Supine sh flex x20 2lb DB TE    TE   2lb DB NR   2lb DB NR   2lb DB NR   2lb DB NR   2lb DB NR   2lb DB NR   2lb DB NR   2lb DB NR         Band sh ext mid & high x30 each BTB NR   Band scap row high, mid, & low x30 each BTB NR   Band sh ER at side x30 BTB NR   Band reverse flys x30 BTB NR   Band sh ER at 90 abd x30 BTB NR   Band RUE lat pull-down x30 BTB NR   Band sit sh add to side x30 BTB NR   Band horizontal abd x30 BTB NR               Pole Stretch  10s x 10  Flex/scap TE                                                               A:  Tolerated well. Progressed pt c standing sh IR stretch d/t stiffness. Keri Homestead was progressed from red to blue theraband c good tolerance to progression. Pt was provided c heavier resistance band for I HEPs.      P: Continue with rehab plan & progress  Lindsay Landry PTA [id number]    Treatment Charges: Mins Units   Initial Evaluation     Re-Evaluation     Ther Exercise         TE 10 1   Manual Therapy     MT     Ther Activities        TA     Gait Training          GT     Neuro Re-education NR 30 2   Modalities     Non-Billable Service Time     Other     Total Time/Units 40 3

## 2021-08-05 ENCOUNTER — TREATMENT (OUTPATIENT)
Dept: PHYSICAL THERAPY | Age: 51
End: 2021-08-05
Payer: MEDICAID

## 2021-08-05 DIAGNOSIS — Z98.890 S/P RIGHT ROTATOR CUFF REPAIR: Primary | ICD-10-CM

## 2021-08-05 PROCEDURE — 97112 NEUROMUSCULAR REEDUCATION: CPT | Performed by: PHYSICAL THERAPIST

## 2021-08-05 NOTE — PROGRESS NOTES
[address] [facility]   Phone: [phone]   Fax: [phone]      Physical Therapy Daily Treatment Note    Date: 2021  Patient Name: Tomasa Garcia. : 1970   MRN: [id number]  DOInjury: 4/15/2021  DOSx: 4/15/2021   Referring Provider: Moy Hogan MD  [address] [place] [address]     Medical Diagnosis:     PREOPERATIVE DIAGNOSIS: Biceps subluxation, rotator cuff tear   Right shoulder.      POSTOPERATIVE DIAGNOSIS: Biceps subluxation with tear, rotator cuff tear (upper subscapularis, partial supraspinatus >50%)  Right shoulder.      OPERATION:  Right shoulder arthroscopy, subacromial decompression with acromioplasty, biceps tenodesis, subscapularis repair, supraspinatus repair with Regeneten patch    Outcome Measure: QuickDASH  30% Disability    S: The pt presents with only c/o soreness with arom right sh flex & abd.    O:   Time 2472-5270     Visit  Repeat outcome measure at mid point and end. Pain      Strength      Palpation      ROM      Modalities            Manual                  Stretch           TE   Exercise            Supine serratus anterior punch x30 3lb DB TE   Supine sh flex x30 3lb DB TE    TE   Prone sh flex \"I\" x30 3lb DB NR   Side-lying sh ER x30 3lb DB NR   Side-lying sh abd x30 3lb DB NR   Side-lying sh horizontal abd x30 3lb DB NR   Prone sh horizontal abd \"T\" x30 3lb DB NR   Prone sh ext x30 3lb DB NR   Prone scap row x30 5lb DB NR   Prone sh Y x30 3lb DB NR         Band sh ext mid & high x30 each purple NR   Band scap row high, mid, & low x30 each purple NR   Band sh ER at side x30 purple NR   Band RUE lat pull-down x30 purple NR   Band sit sh add to side x30 purple NR   Band horizontal abd x30 purple NR   Band overhead stand sh add behind head x30 purple NR   Bent over rows x30 5lb DB NR   Bent over sh horizontal abd (reverse flys) x30 5lb DB NR                                                         A:  Tolerated well.  The pt progressed from blue to purple elastic band resistive training with today's Tx session. The pt also progressed with today's Tx session to perform 3lb dumbbell & 5lb dumbbell resistive training. P: Continue with rehab plan & progress to include plate resistive training BUJONAH.      Anyi Craig, PT [facility] [id number]    Treatment Charges: Mins Units   Initial Evaluation     Re-Evaluation     Ther Exercise         TE     Manual Therapy     MT     Ther Activities        TA     Gait Training          GT     Neuro Re-education NR 40 3   Modalities     Non-Billable Service Time     Other     Total Time/Units 40 3

## 2021-08-12 ENCOUNTER — TREATMENT (OUTPATIENT)
Dept: PHYSICAL THERAPY | Age: 51
End: 2021-08-12
Payer: MEDICAID

## 2021-08-12 DIAGNOSIS — Z98.890 S/P RIGHT ROTATOR CUFF REPAIR: Primary | ICD-10-CM

## 2021-08-12 PROCEDURE — 97112 NEUROMUSCULAR REEDUCATION: CPT

## 2021-08-12 PROCEDURE — 97530 THERAPEUTIC ACTIVITIES: CPT

## 2021-08-12 NOTE — PROGRESS NOTES
[address] [facility]   Phone: [phone]   Fax: [phone]      Physical Therapy Daily Treatment Note    Date: 2021  Patient Name: Anai Serrano. : 1970   MRN: [id number]  DOInjury: 4/15/2021  DOSx: 4/15/2021   Referring Provider: Diann Delgado MD  [address] [place] [address]     Medical Diagnosis:     PREOPERATIVE DIAGNOSIS: Biceps subluxation, rotator cuff tear   Right shoulder.      POSTOPERATIVE DIAGNOSIS: Biceps subluxation with tear, rotator cuff tear (upper subscapularis, partial supraspinatus >50%)  Right shoulder.      OPERATION:  Right shoulder arthroscopy, subacromial decompression with acromioplasty, biceps tenodesis, subscapularis repair, supraspinatus repair with Regeneten patch    Outcome Measure: QuickDASH  30% Disability    S: The pt reports he is feeling good today with no compliant's. O:   Time 925-1005     Visit  Repeat outcome measure at mid point and end. Pain      Strength      Palpation      ROM      Modalities            Manual                  Stretch           TE         Exercise            TE   TE   TE   NR   NR   NR   NR   NR   NR   NR   Prone sh Y x30 3lb DB NR         Band sh ext mid & high x10 10s holds  each grey NR   Band scap row high, mid, & low x10 10s holds each grey NR   purple NR   Band RUE lat pull-down x30 purple NR   Band sit sh add to side x30 purple NR   Band horizontal abd x30 purple NR   Band overhead stand sh add behind head x30 purple NR   Standing horizontal abuction      Bent over rows x30 5lb DB NR   Bent over sh horizontal abd (reverse flys) x30 5lb DB NR   Bent over sh flexion x30 5lb DB NR   Bent over sh ext x30 5lb DB NR               Standing Plate BUE flexion N43 5lb  TA   Standing Plate chest press H88  5lb TA   Standing plate OH press S14 5lb  TA   Standing plate sh twist  M75 5lb TA   Standing Plate flexion over 90* x30 5lb TA         Bicep curls  x30 5lb NR         A:  Tolerated well. The pt progressed with today's Tx session to perform 5lb plate resistive training. No c/o during today's treatment just mm fatigue noted. P: Continue with rehab plan & progress to include plate resistive training BUE.    Lindsay Landry PTA [id number]    Treatment Charges: Mins Units   Initial Evaluation     Re-Evaluation     Ther Exercise         TE     Manual Therapy     MT     Ther Activities        TA 15 1   Gait Training          GT     Neuro Re-education NR 25 2   Modalities     Non-Billable Service Time     Other     Total Time/Units 40 3

## 2021-08-16 ENCOUNTER — TREATMENT (OUTPATIENT)
Dept: PHYSICAL THERAPY | Age: 51
End: 2021-08-16
Payer: MEDICAID

## 2021-08-16 DIAGNOSIS — Z98.890 S/P RIGHT ROTATOR CUFF REPAIR: Primary | ICD-10-CM

## 2021-08-16 PROCEDURE — 97112 NEUROMUSCULAR REEDUCATION: CPT

## 2021-08-16 PROCEDURE — 97110 THERAPEUTIC EXERCISES: CPT

## 2021-08-19 ENCOUNTER — TREATMENT (OUTPATIENT)
Dept: PHYSICAL THERAPY | Age: 51
End: 2021-08-19
Payer: MEDICAID

## 2021-08-19 DIAGNOSIS — Z98.890 S/P RIGHT ROTATOR CUFF REPAIR: Primary | ICD-10-CM

## 2021-08-19 PROCEDURE — 97110 THERAPEUTIC EXERCISES: CPT | Performed by: PHYSICAL THERAPIST

## 2021-08-19 PROCEDURE — 97112 NEUROMUSCULAR REEDUCATION: CPT | Performed by: PHYSICAL THERAPIST

## 2021-08-19 NOTE — PROGRESS NOTES
[address] [facility]   Phone: [phone]   Fax: [phone]    Re-evaluation    Referring Provider: Sapna Finley MD  [address] [place] [address]     Medical Diagnosis:     Date:  2021   Patient: Rusty Mathis. : 1970                        MRN: [id number]  Referring Provider: Sapna Finley MD  [address] [place] [address]                                 Medical Diagnosis:      PREOPERATIVE DIAGNOSIS: Biceps subluxation, rotator cuff tear   Right shoulder.      POSTOPERATIVE DIAGNOSIS: Biceps subluxation with tear, rotator cuff tear (upper subscapularis, partial supraspinatus >50%)  Right shoulder.      OPERATION:  Right shoulder arthroscopy, subacromial decompression with acromioplasty, biceps tenodesis, subscapularis repair, supraspinatus repair with Regeneten patch    CERTIFICATION PERIOD:  2021 to 2021    ATTENDANCE:  Patient has attended 8 of 8 scheduled treatments from 2021  to 2021  . TREATMENTS RECEIVED:  Therapeutic activity, Therapeutic exercise, neuromuscular reeducation, HEP    INITIAL STATUS:  Observations: well nourished male     Inspection: surgical incisions well healed. Moderate forward/rounded sh.  Right sh hiking with arom flex & abd. .  I                                                    Palpation: the pt presents with no abnormal tenderness at right sh region.       Joint/Motion:  Right Shoulder:  AROM: 95° Forward elevation,  85° abduction, 50° ER,  IR to 55  PROM: WFL for right sh flex, abd, ER, & IR     Strength: Not formally assessed due to physician's protocol  Right Shoulder:  3-/5 flex, abd, ER, & IR     Right Elbow: 3/5    CURRENT STATUS:  · ROM: arom right sh flex 150degrees, abd 140degrees, ER 80degrees, & IR 65degrees. · ROM: prom right sh WFL to WNL for all right sh movements. · Strength: MMT right sh: flex, abd, IR, & ER all 4+/5.     OUTCOME MEASURE: QuickDASH 20% Disability    COMMENTS AND RECOMMENDATIONS: The pt has progressed with PT Rehab to achieve improvements with right sh rom & strength. However, the pt continues to present with functional limitations, limited right sh arom, right sh weakness, & reports that right sh is 75% of WNL. Therefore, I recommend that the pt requires continuation of the skilled services of outpt PT Rehab to achieve LTGs, restore & resolve his deficits & limitations, & facilitate return to prior level of function/activity. Thank you for the opportunity to work with your patient. Sariah High, PT [facility] [id number]    I CERTIFY THAT THE ABOVE REASSESSMENT AND PLAN OF CARE FOR PHYSICAL THERAPY SERVICES ARE APPROPRIATE AND MEDICALLY NECESSARY.     Duration: From 8/19/2021 thru 10/19/2021    ________________________                _______________  Physician     Date

## 2021-09-14 ENCOUNTER — TREATMENT (OUTPATIENT)
Dept: PHYSICAL THERAPY | Age: 51
End: 2021-09-14

## 2021-10-04 NOTE — PROGRESS NOTES
[address] [facility]   Phone: [phone]   Fax: [phone]    Discharge Summary      REASON FOR DISCHARGE: Pt failed to attend his final 3 sessions and was d/c d/t lapse of care and noncompliance c POC. He did make considerable progress throughout his plan. PATIENT EDUCATION/INSTRUCTIONS: continue HEP as instructed    RECOMMENDATIONS: call c questions or if additional services are warranted. Thank you for the opportunity to work with your patient. If you have questions or comments, please contact me at numbers listed above.       Seng Epstein [address] , DPT PT [id number] 10/4/2021

## 2022-05-05 ENCOUNTER — APPOINTMENT (OUTPATIENT)
Dept: GENERAL RADIOLOGY | Age: 52
End: 2022-05-05
Payer: MEDICAID

## 2022-05-05 ENCOUNTER — APPOINTMENT (OUTPATIENT)
Dept: CT IMAGING | Age: 52
End: 2022-05-05
Payer: MEDICAID

## 2022-05-05 ENCOUNTER — APPOINTMENT (OUTPATIENT)
Dept: ULTRASOUND IMAGING | Age: 52
End: 2022-05-05
Payer: MEDICAID

## 2022-05-05 ENCOUNTER — HOSPITAL ENCOUNTER (EMERGENCY)
Age: 52
Discharge: HOME | End: 2022-05-05
Attending: STUDENT IN AN ORGANIZED HEALTH CARE EDUCATION/TRAINING PROGRAM
Payer: MEDICAID

## 2022-05-05 VITALS
HEART RATE: 96 BPM | RESPIRATION RATE: 15 BRPM | DIASTOLIC BLOOD PRESSURE: 78 MMHG | WEIGHT: 171 LBS | BODY MASS INDEX: 26.78 KG/M2 | SYSTOLIC BLOOD PRESSURE: 128 MMHG | TEMPERATURE: 98 F | OXYGEN SATURATION: 97 %

## 2022-05-05 DIAGNOSIS — R10.11 ABDOMINAL PAIN, RIGHT UPPER QUADRANT: Primary | ICD-10-CM

## 2022-05-05 DIAGNOSIS — K76.89 HEPATIC CYST: ICD-10-CM

## 2022-05-05 DIAGNOSIS — K82.4 GALLBLADDER POLYP: ICD-10-CM

## 2022-05-05 LAB
ALBUMIN SERPL-MCNC: 4.1 G/DL (ref 3.5–5.2)
ALP BLD-CCNC: 88 U/L (ref 40–129)
ALT SERPL-CCNC: 15 U/L (ref 0–40)
ANION GAP SERPL CALCULATED.3IONS-SCNC: 11 MMOL/L (ref 7–16)
AST SERPL-CCNC: 17 U/L (ref 0–39)
BASOPHILS ABSOLUTE: 0.09 E9/L (ref 0–0.2)
BASOPHILS RELATIVE PERCENT: 0.9 % (ref 0–2)
BILIRUB SERPL-MCNC: 0.3 MG/DL (ref 0–1.2)
BILIRUBIN DIRECT: 0.2 MG/DL (ref 0–0.3)
BILIRUBIN, INDIRECT: 0.1 MG/DL (ref 0–1)
BUN BLDV-MCNC: 17 MG/DL (ref 6–20)
CALCIUM SERPL-MCNC: 9.4 MG/DL (ref 8.6–10.2)
CHLORIDE BLD-SCNC: 105 MMOL/L (ref 98–107)
CO2: 22 MMOL/L (ref 22–29)
CREAT SERPL-MCNC: 0.7 MG/DL (ref 0.7–1.2)
D DIMER: <200 NG/ML DDU
EKG ATRIAL RATE: 57 BPM
EKG P AXIS: 10 DEGREES
EKG P-R INTERVAL: 160 MS
EKG Q-T INTERVAL: 424 MS
EKG QRS DURATION: 88 MS
EKG QTC CALCULATION (BAZETT): 412 MS
EKG R AXIS: 24 DEGREES
EKG T AXIS: 23 DEGREES
EKG VENTRICULAR RATE: 57 BPM
EOSINOPHILS ABSOLUTE: 0.71 E9/L (ref 0.05–0.5)
EOSINOPHILS RELATIVE PERCENT: 7.2 % (ref 0–6)
GFR AFRICAN AMERICAN: >60
GFR NON-AFRICAN AMERICAN: >60 ML/MIN/1.73
GLUCOSE BLD-MCNC: 104 MG/DL (ref 74–99)
HCT VFR BLD CALC: 44.8 % (ref 37–54)
HEMOGLOBIN: 15.3 G/DL (ref 12.5–16.5)
IMMATURE GRANULOCYTES #: 0.03 E9/L
IMMATURE GRANULOCYTES %: 0.3 % (ref 0–5)
LACTIC ACID: 0.9 MMOL/L (ref 0.5–2.2)
LIPASE: 28 U/L (ref 13–60)
LYMPHOCYTES ABSOLUTE: 2.04 E9/L (ref 1.5–4)
LYMPHOCYTES RELATIVE PERCENT: 20.7 % (ref 20–42)
MCH RBC QN AUTO: 30.5 PG (ref 26–35)
MCHC RBC AUTO-ENTMCNC: 34.2 % (ref 32–34.5)
MCV RBC AUTO: 89.2 FL (ref 80–99.9)
MONOCYTES ABSOLUTE: 0.58 E9/L (ref 0.1–0.95)
MONOCYTES RELATIVE PERCENT: 5.9 % (ref 2–12)
NEUTROPHILS ABSOLUTE: 6.39 E9/L (ref 1.8–7.3)
NEUTROPHILS RELATIVE PERCENT: 65 % (ref 43–80)
PDW BLD-RTO: 12.5 FL (ref 11.5–15)
PLATELET # BLD: 215 E9/L (ref 130–450)
PMV BLD AUTO: 9.4 FL (ref 7–12)
POTASSIUM REFLEX MAGNESIUM: 4.3 MMOL/L (ref 3.5–5)
RBC # BLD: 5.02 E12/L (ref 3.8–5.8)
SODIUM BLD-SCNC: 138 MMOL/L (ref 132–146)
TOTAL PROTEIN: 7.2 G/DL (ref 6.4–8.3)
TROPONIN, HIGH SENSITIVITY: 7 NG/L (ref 0–11)
WBC # BLD: 9.8 E9/L (ref 4.5–11.5)

## 2022-05-05 PROCEDURE — 83605 ASSAY OF LACTIC ACID: CPT

## 2022-05-05 PROCEDURE — 93005 ELECTROCARDIOGRAM TRACING: CPT | Performed by: STUDENT IN AN ORGANIZED HEALTH CARE EDUCATION/TRAINING PROGRAM

## 2022-05-05 PROCEDURE — 96374 THER/PROPH/DIAG INJ IV PUSH: CPT

## 2022-05-05 PROCEDURE — 83690 ASSAY OF LIPASE: CPT

## 2022-05-05 PROCEDURE — 36415 COLL VENOUS BLD VENIPUNCTURE: CPT

## 2022-05-05 PROCEDURE — 74177 CT ABD & PELVIS W/CONTRAST: CPT

## 2022-05-05 PROCEDURE — 80048 BASIC METABOLIC PNL TOTAL CA: CPT

## 2022-05-05 PROCEDURE — 96375 TX/PRO/DX INJ NEW DRUG ADDON: CPT

## 2022-05-05 PROCEDURE — 93010 ELECTROCARDIOGRAM REPORT: CPT | Performed by: INTERNAL MEDICINE

## 2022-05-05 PROCEDURE — 71045 X-RAY EXAM CHEST 1 VIEW: CPT

## 2022-05-05 PROCEDURE — 84484 ASSAY OF TROPONIN QUANT: CPT

## 2022-05-05 PROCEDURE — 85378 FIBRIN DEGRADE SEMIQUANT: CPT

## 2022-05-05 PROCEDURE — 99285 EMERGENCY DEPT VISIT HI MDM: CPT

## 2022-05-05 PROCEDURE — 76705 ECHO EXAM OF ABDOMEN: CPT

## 2022-05-05 PROCEDURE — 80076 HEPATIC FUNCTION PANEL: CPT

## 2022-05-05 PROCEDURE — 6360000002 HC RX W HCPCS: Performed by: STUDENT IN AN ORGANIZED HEALTH CARE EDUCATION/TRAINING PROGRAM

## 2022-05-05 PROCEDURE — 2580000003 HC RX 258: Performed by: STUDENT IN AN ORGANIZED HEALTH CARE EDUCATION/TRAINING PROGRAM

## 2022-05-05 PROCEDURE — 85025 COMPLETE CBC W/AUTO DIFF WBC: CPT

## 2022-05-05 PROCEDURE — 6360000004 HC RX CONTRAST MEDICATION: Performed by: RADIOLOGY

## 2022-05-05 RX ORDER — 0.9 % SODIUM CHLORIDE 0.9 %
1000 INTRAVENOUS SOLUTION INTRAVENOUS ONCE
Status: COMPLETED | OUTPATIENT
Start: 2022-05-05 | End: 2022-05-05

## 2022-05-05 RX ORDER — NAPROXEN 500 MG/1
500 TABLET ORAL 2 TIMES DAILY
Qty: 28 TABLET | Refills: 0 | Status: SHIPPED | OUTPATIENT
Start: 2022-05-05 | End: 2022-05-19

## 2022-05-05 RX ORDER — MORPHINE SULFATE 4 MG/ML
4 INJECTION, SOLUTION INTRAMUSCULAR; INTRAVENOUS ONCE
Status: COMPLETED | OUTPATIENT
Start: 2022-05-05 | End: 2022-05-05

## 2022-05-05 RX ORDER — ONDANSETRON 2 MG/ML
4 INJECTION INTRAMUSCULAR; INTRAVENOUS ONCE
Status: COMPLETED | OUTPATIENT
Start: 2022-05-05 | End: 2022-05-05

## 2022-05-05 RX ADMIN — MORPHINE SULFATE 4 MG: 4 INJECTION, SOLUTION INTRAMUSCULAR; INTRAVENOUS at 10:31

## 2022-05-05 RX ADMIN — IOPAMIDOL 75 ML: 755 INJECTION, SOLUTION INTRAVENOUS at 13:14

## 2022-05-05 RX ADMIN — ONDANSETRON 4 MG: 2 INJECTION INTRAMUSCULAR; INTRAVENOUS at 10:31

## 2022-05-05 RX ADMIN — SODIUM CHLORIDE 1000 ML: 9 INJECTION, SOLUTION INTRAVENOUS at 10:31

## 2022-05-05 ASSESSMENT — PAIN DESCRIPTION - ONSET
ONSET: ON-GOING
ONSET: ON-GOING

## 2022-05-05 ASSESSMENT — PAIN - FUNCTIONAL ASSESSMENT
PAIN_FUNCTIONAL_ASSESSMENT: 0-10
PAIN_FUNCTIONAL_ASSESSMENT: 0-10
PAIN_FUNCTIONAL_ASSESSMENT: PREVENTS OR INTERFERES SOME ACTIVE ACTIVITIES AND ADLS

## 2022-05-05 ASSESSMENT — PAIN SCALES - GENERAL
PAINLEVEL_OUTOF10: 7
PAINLEVEL_OUTOF10: 2

## 2022-05-05 ASSESSMENT — PAIN DESCRIPTION - DESCRIPTORS
DESCRIPTORS: ACHING
DESCRIPTORS: SORE;DISCOMFORT

## 2022-05-05 ASSESSMENT — PAIN DESCRIPTION - ORIENTATION
ORIENTATION: RIGHT;UPPER
ORIENTATION: RIGHT;UPPER

## 2022-05-05 ASSESSMENT — PAIN DESCRIPTION - PAIN TYPE
TYPE: ACUTE PAIN
TYPE: ACUTE PAIN

## 2022-05-05 ASSESSMENT — PAIN DESCRIPTION - LOCATION
LOCATION: ABDOMEN
LOCATION: ABDOMEN

## 2022-05-05 ASSESSMENT — PAIN DESCRIPTION - FREQUENCY
FREQUENCY: CONTINUOUS
FREQUENCY: CONTINUOUS

## 2022-05-05 NOTE — ED PROVIDER NOTES
Department of Emergency Medicine   ED  Provider Note  Admit Date/RoomTime: 5/5/2022  9:12 AM  ED Room: 02/02          History of Present Illness:  5/5/22, Time: 10:01 AM EDT  Chief Complaint   Patient presents with    Abdominal Pain     RUQ pain since last night. denies ARTEM Perez. is a 54 y.o. male presenting to the ED for abdominal pain, beginning last night. The complaint has been persistent, moderate in severity, and worsened by nothing. The patient is a 61-year-old male who presents the emergency department complaining of abdominal pain. The patient symptoms are sudden onset last night, has been persistent, moderate in severity, nothing makes it better or worse. He states that he has been having pain in his right upper quadrant that he describes as aching and feeling bloated over this area since last night and has been persistent. He did not take any for symptoms prior to arrival.  He states he still has his gallbladder and does not have a history of any prior abdominal surgeries. He denies any fever, chills, nausea, vomiting, diarrhea, chest pain, shortness of breath, recent hospitalization, recent illness, or other acute symptoms or concerns. Review of Systems:   A complete review of systems was performed and pertinent positives and negatives are stated within HPI, all other systems reviewed and are negative.        --------------------------------------------- PAST HISTORY ---------------------------------------------  Past Medical History:  has a past medical history of Asthma, COPD (chronic obstructive pulmonary disease) ([facility] [address] Depression, Hypertension, and Shoulder pain. Past Surgical History:  has a past surgical history that includes Shoulder arthroscopy (Right, 4/15/2021). Social History:  reports that he has been smoking cigarettes. He has a 10.00 pack-year smoking history. He has never used smokeless tobacco. He reports current drug use.  Drug: Marijuana Riley Pino. He reports that he does not drink alcohol. Family History: family history is not on file. . Unless otherwise noted, family history is non contributory    The patients home medications have been reviewed. Allergies: Nickel    I have reviewed the past medical history, past surgical history, social history, and family history    ---------------------------------------------------PHYSICAL EXAM--------------------------------------    Constitutional/General: Alert and oriented x3  Head: Normocephalic and atraumatic  Eyes:  EOMI, sclera non icteric  ENT: Oropharynx clear, handling secretions, no trismus, no asymmetry of the posterior oropharynx or uvular edema  Neck: Supple, full ROM, no stridor, no meningeal signs  Respiratory: Lungs clear to auscultation bilaterally, no wheezes, rales, or rhonchi. Not in respiratory distress  Cardiovascular:  Regular rate. Regular rhythm. No murmurs, no gallops, no rubs. 2+ distal pulses. Equal extremity pulses. Gastrointestinal: Mild reproducible tenderness over the right upper quadrant. There is no rigidity or rebound tenderness or guarding. No overlying skin changes. Positive Chung sign. Musculoskeletal: Moves all extremities x 4. Warm and well perfused, no clubbing, no cyanosis, no edema. Capillary refill <3 seconds  Skin: skin warm and dry. No rashes. Neurologic: GCS 15, no focal deficits, symmetric strength 5/5 in the upper and lower extremities bilaterally  Psychiatric: Normal Affect    -------------------------------------------------- RESULTS -------------------------------------------------  I have personally reviewed all laboratory and imaging results for this patient. Results are listed below.      LABS: (Lab results interpreted by me)  Results for orders placed or performed during the hospital encounter of 05/05/22   CBC with Auto Differential   Result Value Ref Range    WBC 9.8 4.5 - 11.5 E9/L    RBC 5.02 3.80 - 5.80 E12/L    Hemoglobin 15.3 12.5 - 16.5 g/dL    Hematocrit 44.8 37.0 - 54.0 %    MCV 89.2 80.0 - 99.9 fL    MCH 30.5 26.0 - 35.0 pg    MCHC 34.2 32.0 - 34.5 %    RDW 12.5 11.5 - 15.0 fL    Platelets 747 032 - 711 E9/L    MPV 9.4 7.0 - 12.0 fL    Neutrophils % 65.0 43.0 - 80.0 %    Immature Granulocytes % 0.3 0.0 - 5.0 %    Lymphocytes % 20.7 20.0 - 42.0 %    Monocytes % 5.9 2.0 - 12.0 %    Eosinophils % 7.2 (H) 0.0 - 6.0 %    Basophils % 0.9 0.0 - 2.0 %    Neutrophils Absolute 6.39 1.80 - 7.30 E9/L    Immature Granulocytes # 0.03 E9/L    Lymphocytes Absolute 2.04 1.50 - 4.00 E9/L    Monocytes Absolute 0.58 0.10 - 0.95 E9/L    Eosinophils Absolute 0.71 (H) 0.05 - 0.50 E9/L    Basophils Absolute 0.09 0.00 - 0.20 G3/Q   Basic Metabolic Panel w/ Reflex to MG   Result Value Ref Range    Sodium 138 132 - 146 mmol/L    Potassium reflex Magnesium 4.3 3.5 - 5.0 mmol/L    Chloride 105 98 - 107 mmol/L    CO2 22 22 - 29 mmol/L    Anion Gap 11 7 - 16 mmol/L    Glucose 104 (H) 74 - 99 mg/dL    BUN 17 6 - 20 mg/dL    CREATININE 0.7 0.7 - 1.2 mg/dL    GFR Non-African American >60 >=60 mL/min/1.73    GFR African American >60     Calcium 9.4 8.6 - 10.2 mg/dL   Hepatic Function Panel   Result Value Ref Range    Total Protein 7.2 6.4 - 8.3 g/dL    Albumin 4.1 3.5 - 5.2 g/dL    Alkaline Phosphatase 88 40 - 129 U/L    ALT 15 0 - 40 U/L    AST 17 0 - 39 U/L    Total Bilirubin 0.3 0.0 - 1.2 mg/dL    Bilirubin, Direct 0.2 0.0 - 0.3 mg/dL    Bilirubin, Indirect 0.1 0.0 - 1.0 mg/dL   Lipase   Result Value Ref Range    Lipase 28 13 - 60 U/L   Lactic Acid   Result Value Ref Range    Lactic Acid 0.9 0.5 - 2.2 mmol/L   Troponin   Result Value Ref Range    Troponin, High Sensitivity 7 0 - 11 ng/L   D-Dimer, Quantitative   Result Value Ref Range    D-Dimer, Quant <200 ng/mL DDU   EKG 12 Lead   Result Value Ref Range    Ventricular Rate 57 BPM    Atrial Rate 57 BPM    P-R Interval 160 ms    QRS Duration 88 ms    Q-T Interval 424 ms    QTc Calculation (Bazett) 412 ms    P Axis 10 degrees    R Axis 24 degrees    T Axis 23 degrees   ,       RADIOLOGY:  Interpreted by Radiologist unless otherwise specified  CT ABDOMEN PELVIS W IV CONTRAST Additional Contrast? None   Final Result   No acute abdominal or pelvic abnormality      Probable small hepatic cysts. XR CHEST PORTABLE   Final Result   Minimal opacity in the left lung base may represent atelectasis and/or   scarring with similar appearance compared to 04/12/2021. US GALLBLADDER RUQ   Final Result   1. Mild hepatomegaly. 2.  Right lobe liver hemangioma. 3.  Gallbladder polyp. EKG Interpretation  Interpreted by emergency department physician, Dr. Carmencita Salmeron    EKG: This EKG is signed and interpreted by me. Rate: 57  Rhythm: Sinus  Interpretation: Normal sinus rhythm with sinus arrhythmia, normal axis, no acute ST elevations or depressions, intervals within normal limits, QTC is 412  Comparison: no previous EKG available       ------------------------- NURSING NOTES AND VITALS REVIEWED ---------------------------   The nursing notes within the ED encounter and vital signs as below have been reviewed by myself  /70   Pulse 96   Temp 98 °F (36.7 °C) (Infrared)   Resp 16   Wt 171 lb (77.6 kg)   SpO2 97%   BMI 26.78 kg/m²     Oxygen Saturation Interpretation: Normal    The patients available past medical records and past encounters were reviewed. ------------------------------ ED COURSE/MEDICAL DECISION MAKING----------------------  Medications   0.9 % sodium chloride bolus (1,000 mLs IntraVENous New Bag 5/5/22 1031)   morphine sulfate (PF) injection 4 mg (4 mg IntraVENous Given 5/5/22 1031)   ondansetron (ZOFRAN) injection 4 mg (4 mg IntraVENous Given 5/5/22 1031)   iopamidol (ISOVUE-370) 76 % injection 75 mL (75 mLs IntraVENous Given 5/5/22 1314)           The cardiac monitor revealed NSR with a heart rate in the 90s as interpreted by me.  The cardiac monitor was ordered secondary to the patient's abdominal pain and to monitor the patient for dysrhythmia. CPT [id number]       I, Dr. Bahman Reyes, am the primary provider of record    Medical Decision Making:   The patient is a 46-year-old male who presents to the emergency department complaining of abdominal pain. He is hemodynamically stable, nontoxic, and in no acute distress. Labs are reassuring. No lactic acidosis or leukocytosis. EKG does not show acute ischemia and troponin and D-dimer were negative. Right upper quadrant ultrasound shows a gallbladder polyp and hemangioma, but no evidence of stones present. Patient was still complaining of pain so did obtain a CT scan which was negative for any acute abnormalities. Chest x-ray shows some atelectasis on the left but no right lower lobe pneumonia. Discussed with pain in his symptoms may be muscular but did recommend him to follow-up closely with his family doctor and strict return precautions were given. Patient agreed with plan. Oxygen Saturation Interpretation: 97 % on room air. Re-Evaluations:  ED Course as of 05/05/22 1510   Thu May 05, 2022   1433 Reevaluated the patient. He is now complaining of pain when he takes a deep breath. We will add on D-dimer at this time. Discussed negative results and he states he is still having pain and is mostly worse when he takes a deep breath. [KG]      ED Course User Index  [KG] Tabby Lux, DO       Dimer is negative. Patient was in no distress and will follow-up with his doctor tomorrow. This patient's ED course included: a personal history and physicial examination, re-evaluation prior to disposition, multiple bedside re-evaluations, IV medications, cardiac monitoring, continuous pulse oximetry and complex medical decision making and emergency management    This patient has remained hemodynamically stable during their ED course. Counseling:    The emergency provider has spoken with the patient and discussed todays results, in addition to providing specific details for the plan of care and counseling regarding the diagnosis and prognosis. Questions are answered at this time and they are agreeable with the plan.       --------------------------------- IMPRESSION AND DISPOSITION ---------------------------------    IMPRESSION  1. Abdominal pain, right upper quadrant    2. Hepatic cyst    3. Gallbladder polyp        DISPOSITION  Disposition: Discharge to home  Patient condition is stable        NOTE: This report was transcribed using voice recognition software.  Every effort was made to ensure accuracy; however, inadvertent computerized transcription errors may be present       Mallory May DO  05/05/22 [id number]

## 2022-11-07 ENCOUNTER — HOSPITAL ENCOUNTER (EMERGENCY)
Age: 52
Discharge: HOME | End: 2022-11-07
Attending: EMERGENCY MEDICINE
Payer: MEDICAID

## 2022-11-07 VITALS
BODY MASS INDEX: 26.78 KG/M2 | WEIGHT: 171 LBS | TEMPERATURE: 98.5 F | OXYGEN SATURATION: 96 % | DIASTOLIC BLOOD PRESSURE: 83 MMHG | RESPIRATION RATE: 18 BRPM | SYSTOLIC BLOOD PRESSURE: 139 MMHG | HEART RATE: 59 BPM

## 2022-11-07 DIAGNOSIS — R11.2 NAUSEA VOMITING AND DIARRHEA: Primary | ICD-10-CM

## 2022-11-07 DIAGNOSIS — R19.7 NAUSEA VOMITING AND DIARRHEA: Primary | ICD-10-CM

## 2022-11-07 LAB
ALBUMIN SERPL-MCNC: 4.2 G/DL (ref 3.5–5.2)
ALP BLD-CCNC: 102 U/L (ref 40–129)
ALT SERPL-CCNC: 15 U/L (ref 0–40)
ANION GAP SERPL CALCULATED.3IONS-SCNC: 14 MMOL/L (ref 7–16)
AST SERPL-CCNC: 19 U/L (ref 0–39)
BACTERIA: ABNORMAL /HPF
BASOPHILS ABSOLUTE: 0.05 E9/L (ref 0–0.2)
BASOPHILS RELATIVE PERCENT: 0.7 % (ref 0–2)
BILIRUB SERPL-MCNC: 0.3 MG/DL (ref 0–1.2)
BILIRUBIN URINE: ABNORMAL
BLOOD, URINE: ABNORMAL
BUN BLDV-MCNC: 8 MG/DL (ref 6–20)
CALCIUM SERPL-MCNC: 9.5 MG/DL (ref 8.6–10.2)
CHLORIDE BLD-SCNC: 100 MMOL/L (ref 98–107)
CLARITY: CLEAR
CO2: 22 MMOL/L (ref 22–29)
COLOR: YELLOW
CREAT SERPL-MCNC: 0.8 MG/DL (ref 0.7–1.2)
EOSINOPHILS ABSOLUTE: 0.15 E9/L (ref 0.05–0.5)
EOSINOPHILS RELATIVE PERCENT: 2 % (ref 0–6)
GFR SERPL CREATININE-BSD FRML MDRD: >60 ML/MIN/1.73
GLUCOSE BLD-MCNC: 127 MG/DL (ref 74–99)
GLUCOSE URINE: NEGATIVE MG/DL
HCT VFR BLD CALC: 48.6 % (ref 37–54)
HEMOGLOBIN: 17.5 G/DL (ref 12.5–16.5)
IMMATURE GRANULOCYTES #: 0.01 E9/L
IMMATURE GRANULOCYTES %: 0.1 % (ref 0–5)
KETONES, URINE: 15 MG/DL
LEUKOCYTE ESTERASE, URINE: NEGATIVE
LYMPHOCYTES ABSOLUTE: 1.2 E9/L (ref 1.5–4)
LYMPHOCYTES RELATIVE PERCENT: 15.8 % (ref 20–42)
MCH RBC QN AUTO: 30.8 PG (ref 26–35)
MCHC RBC AUTO-ENTMCNC: 36 % (ref 32–34.5)
MCV RBC AUTO: 85.4 FL (ref 80–99.9)
MONOCYTES ABSOLUTE: 1.06 E9/L (ref 0.1–0.95)
MONOCYTES RELATIVE PERCENT: 14 % (ref 2–12)
NEUTROPHILS ABSOLUTE: 5.11 E9/L (ref 1.8–7.3)
NEUTROPHILS RELATIVE PERCENT: 67.4 % (ref 43–80)
NITRITE, URINE: NEGATIVE
PDW BLD-RTO: 12.1 FL (ref 11.5–15)
PH UA: 6 (ref 5–9)
PLATELET # BLD: 256 E9/L (ref 130–450)
PMV BLD AUTO: 9.2 FL (ref 7–12)
POTASSIUM SERPL-SCNC: 3.6 MMOL/L (ref 3.5–5)
PROTEIN UA: 100 MG/DL
RBC # BLD: 5.69 E12/L (ref 3.8–5.8)
RBC UA: ABNORMAL /HPF (ref 0–2)
SODIUM BLD-SCNC: 136 MMOL/L (ref 132–146)
SPECIFIC GRAVITY UA: >=1.03 (ref 1–1.03)
TOTAL PROTEIN: 8.2 G/DL (ref 6.4–8.3)
UROBILINOGEN, URINE: 0.2 E.U./DL
WBC # BLD: 7.6 E9/L (ref 4.5–11.5)
WBC UA: ABNORMAL /HPF (ref 0–5)

## 2022-11-07 PROCEDURE — 2580000003 HC RX 258: Performed by: EMERGENCY MEDICINE

## 2022-11-07 PROCEDURE — 99284 EMERGENCY DEPT VISIT MOD MDM: CPT

## 2022-11-07 PROCEDURE — 36415 COLL VENOUS BLD VENIPUNCTURE: CPT

## 2022-11-07 PROCEDURE — 6360000002 HC RX W HCPCS: Performed by: EMERGENCY MEDICINE

## 2022-11-07 PROCEDURE — 96374 THER/PROPH/DIAG INJ IV PUSH: CPT

## 2022-11-07 PROCEDURE — 85025 COMPLETE CBC W/AUTO DIFF WBC: CPT

## 2022-11-07 PROCEDURE — 96375 TX/PRO/DX INJ NEW DRUG ADDON: CPT

## 2022-11-07 PROCEDURE — 80053 COMPREHEN METABOLIC PANEL: CPT

## 2022-11-07 PROCEDURE — 81001 URINALYSIS AUTO W/SCOPE: CPT

## 2022-11-07 RX ORDER — 0.9 % SODIUM CHLORIDE 0.9 %
1000 INTRAVENOUS SOLUTION INTRAVENOUS ONCE
Status: COMPLETED | OUTPATIENT
Start: 2022-11-07 | End: 2022-11-07

## 2022-11-07 RX ORDER — ONDANSETRON 4 MG/1
4 TABLET, ORALLY DISINTEGRATING ORAL EVERY 8 HOURS PRN
Qty: 20 TABLET | Refills: 0 | Status: SHIPPED | OUTPATIENT
Start: 2022-11-07 | End: 2022-11-14

## 2022-11-07 RX ORDER — FENTANYL CITRATE 50 UG/ML
50 INJECTION, SOLUTION INTRAMUSCULAR; INTRAVENOUS ONCE
Status: COMPLETED | OUTPATIENT
Start: 2022-11-07 | End: 2022-11-07

## 2022-11-07 RX ORDER — DICYCLOMINE HYDROCHLORIDE 10 MG/1
10 CAPSULE ORAL EVERY 6 HOURS PRN
Qty: 20 CAPSULE | Refills: 0 | Status: SHIPPED | OUTPATIENT
Start: 2022-11-07 | End: 2022-11-12

## 2022-11-07 RX ORDER — ONDANSETRON 2 MG/ML
4 INJECTION INTRAMUSCULAR; INTRAVENOUS ONCE
Status: COMPLETED | OUTPATIENT
Start: 2022-11-07 | End: 2022-11-07

## 2022-11-07 RX ADMIN — SODIUM CHLORIDE 1000 ML: 9 INJECTION, SOLUTION INTRAVENOUS at 10:15

## 2022-11-07 RX ADMIN — FENTANYL CITRATE 50 MCG: 50 INJECTION, SOLUTION INTRAMUSCULAR; INTRAVENOUS at 10:23

## 2022-11-07 RX ADMIN — ONDANSETRON 4 MG: 2 INJECTION INTRAMUSCULAR; INTRAVENOUS at 10:23

## 2022-11-07 ASSESSMENT — PAIN DESCRIPTION - LOCATION
LOCATION: ABDOMEN
LOCATION: ABDOMEN

## 2022-11-07 ASSESSMENT — PAIN SCALES - GENERAL
PAINLEVEL_OUTOF10: 6
PAINLEVEL_OUTOF10: 5

## 2022-11-07 ASSESSMENT — PAIN - FUNCTIONAL ASSESSMENT
PAIN_FUNCTIONAL_ASSESSMENT: 0-10
PAIN_FUNCTIONAL_ASSESSMENT: PREVENTS OR INTERFERES SOME ACTIVE ACTIVITIES AND ADLS

## 2022-11-07 ASSESSMENT — PAIN DESCRIPTION - DESCRIPTORS
DESCRIPTORS: CRAMPING
DESCRIPTORS: CRAMPING

## 2022-11-07 ASSESSMENT — PAIN DESCRIPTION - FREQUENCY: FREQUENCY: CONTINUOUS

## 2022-11-07 ASSESSMENT — LIFESTYLE VARIABLES: HOW OFTEN DO YOU HAVE A DRINK CONTAINING ALCOHOL: NEVER

## 2022-11-07 ASSESSMENT — PAIN DESCRIPTION - PAIN TYPE: TYPE: ACUTE PAIN

## 2022-11-07 ASSESSMENT — PAIN DESCRIPTION - ORIENTATION: ORIENTATION: LOWER

## 2022-11-07 NOTE — ED PROVIDER NOTES
HPI:  11/7/22,   Time: 9:54 AM NASRIN Pendleton is a 50 y.o. male presenting to the ED for n/v/d, beginning 3 days ago. The complaint has been persistent, moderate in severity, and worsened by nothing. Feels has food poisoning, hasn't got better. No cough/congestion/runny nose/sore throat/cough/congestion    Review of Systems:   Pertinent positives and negatives are stated within HPI, all other systems reviewed and are negative.          --------------------------------------------- PAST HISTORY ---------------------------------------------  Past Medical History:  has a past medical history of Asthma, COPD (chronic obstructive pulmonary disease) ([facility] [address] Depression, Hypertension, and Shoulder pain. Past Surgical History:  has a past surgical history that includes Shoulder arthroscopy (Right, 4/15/2021). Social History:  reports that he has been smoking cigarettes. He has a 10.00 pack-year smoking history. He has never used smokeless tobacco. He reports current drug use. Drug: Marijuana Veliliana Sinclair). He reports that he does not drink alcohol. Family History: family history is not on file. The patients home medications have been reviewed. Allergies: Nickel        ---------------------------------------------------PHYSICAL EXAM--------------------------------------    Constitutional/General: Alert and oriented x3, well appearing, non toxic in NAD  Head: Normocephalic and atraumatic  Eyes: PERRL, EOMI, conjunctive normal, sclera non icteric  Mouth: Oropharynx clear, handling secretions, no trismus, no asymmetry of the posterior oropharynx or uvular edema  Neck: Supple, full ROM, n  Respiratory: Lungs clear to auscultation bilaterally, no wheezes, rales, or rhonchi. Not in respiratory distress  Cardiovascular:  Regular rate. Regular rhythm. No murmurs, gallops, or rubs. 2+ distal pulses  Chest: No chest wall tenderness  GI:  Abdomen Soft, Non tender, Non distended.      Musculoskeletal: Moves all extremities x 4. Warm and well perfused, no clubbing, cyanosis, or edema. Capillary refill <3 seconds  Integument: skin warm and dry. No rashes. Lymphatic: no lymphadenopathy noted  Neurologic: GCS 15, no focal deficits, symmetric strength 5/5 in the upper and lower extremities bilaterally  Psychiatric: Normal Affect    -------------------------------------------------- RESULTS -------------------------------------------------  I have personally reviewed all laboratory and imaging results for this patient. Results are listed below.      LABS:  Results for orders placed or performed during the hospital encounter of 11/07/22   CBC with Auto Differential   Result Value Ref Range    WBC 7.6 4.5 - 11.5 E9/L    RBC 5.69 3.80 - 5.80 E12/L    Hemoglobin 17.5 (H) 12.5 - 16.5 g/dL    Hematocrit 48.6 37.0 - 54.0 %    MCV 85.4 80.0 - 99.9 fL    MCH 30.8 26.0 - 35.0 pg    MCHC 36.0 (H) 32.0 - 34.5 %    RDW 12.1 11.5 - 15.0 fL    Platelets 442 735 - 708 E9/L    MPV 9.2 7.0 - 12.0 fL    Neutrophils % 67.4 43.0 - 80.0 %    Immature Granulocytes % 0.1 0.0 - 5.0 %    Lymphocytes % 15.8 (L) 20.0 - 42.0 %    Monocytes % 14.0 (H) 2.0 - 12.0 %    Eosinophils % 2.0 0.0 - 6.0 %    Basophils % 0.7 0.0 - 2.0 %    Neutrophils Absolute 5.11 1.80 - 7.30 E9/L    Immature Granulocytes # 0.01 E9/L    Lymphocytes Absolute 1.20 (L) 1.50 - 4.00 E9/L    Monocytes Absolute 1.06 (H) 0.10 - 0.95 E9/L    Eosinophils Absolute 0.15 0.05 - 0.50 E9/L    Basophils Absolute 0.05 0.00 - 0.20 E9/L   CMP   Result Value Ref Range    Sodium 136 132 - 146 mmol/L    Potassium 3.6 3.5 - 5.0 mmol/L    Chloride 100 98 - 107 mmol/L    CO2 22 22 - 29 mmol/L    Anion Gap 14 7 - 16 mmol/L    Glucose 127 (H) 74 - 99 mg/dL    BUN 8 6 - 20 mg/dL    Creatinine 0.8 0.7 - 1.2 mg/dL    Est, Glom Filt Rate >60 >=60 mL/min/1.73    Calcium 9.5 8.6 - 10.2 mg/dL    Total Protein 8.2 6.4 - 8.3 g/dL    Albumin 4.2 3.5 - 5.2 g/dL    Total Bilirubin 0.3 0.0 - 1.2 mg/dL    Alkaline Phosphatase 102 40 - 129 U/L    ALT 15 0 - 40 U/L    AST 19 0 - 39 U/L   Urinalysis with Microscopic   Result Value Ref Range    Color, UA Yellow Straw/Yellow    Clarity, UA Clear Clear    Glucose, Ur Negative Negative mg/dL    Bilirubin Urine SMALL (A) Negative    Ketones, Urine 15 (A) Negative mg/dL    Specific Gravity, UA >=1.030 1.005 - 1.030    Blood, Urine TRACE-INTACT Negative    pH, UA 6.0 5.0 - 9.0    Protein,  (A) Negative mg/dL    Urobilinogen, Urine 0.2 <2.0 E.U./dL    Nitrite, Urine Negative Negative    Leukocyte Esterase, Urine Negative Negative    WBC, UA NONE 0 - 5 /HPF    RBC, UA 0-1 0 - 2 /HPF    Bacteria, UA NONE SEEN None Seen /HPF       RADIOLOGY:  Interpreted by Radiologist.  No orders to display       EKG: This EKG is signed and interpreted by the EP. Time:   Rate:   Rhythm:   Interpretation:   Comparison:       ------------------------- NURSING NOTES AND VITALS REVIEWED ---------------------------   The nursing notes within the ED encounter and vital signs as below have been reviewed by myself. /83   Pulse 59   Temp 98.5 °F (36.9 °C)   Resp 18   Wt 171 lb (77.6 kg)   SpO2 96%   BMI 26.78 kg/m²   Oxygen Saturation Interpretation: Normal    The patients available past medical records and past encounters were reviewed. ------------------------------ ED COURSE/MEDICAL DECISION MAKING----------------------  Medications   0.9 % sodium chloride bolus (0 mLs IntraVENous Stopped 11/7/22 1119)   ondansetron (ZOFRAN) injection 4 mg (4 mg IntraVENous Given 11/7/22 1023)   fentaNYL (SUBLIMAZE) injection 50 mcg (50 mcg IntraVENous Given 11/7/22 1023)         ED COURSE:       Medical Decision Making:    Non toxic, tx supp with outpt fu      This patient's ED course included: a personal history and physicial examination    This patient has remained hemodynamically stable during their ED course. Counseling:    The emergency provider has spoken with the patient and discussed todays results, in addition to providing specific details for the plan of care and counseling regarding the diagnosis and prognosis. Questions are answered at this time and they are agreeable with the plan.       --------------------------------- IMPRESSION AND DISPOSITION ---------------------------------    IMPRESSION  1. Nausea vomiting and diarrhea        DISPOSITION  Disposition: Discharge to home  Patient condition is stable    NOTE: This report was transcribed using voice recognition software.  Every effort was made to ensure accuracy; however, inadvertent computerized transcription errors may be present        Stan Mejia MD  11/08/22 1007

## 2022-11-07 NOTE — Clinical Note
Yohan Drummond was seen and treated in our emergency department on 11/7/2022. He may return to work on 11/09/2022. If you have any questions or concerns, please don't hesitate to call.       Mary Jo Cast MD

## 2023-01-13 ENCOUNTER — OFFICE VISIT (OUTPATIENT)
Dept: FAMILY MEDICINE CLINIC | Age: 53
End: 2023-01-13
Payer: MEDICAID

## 2023-01-13 VITALS
WEIGHT: 184 LBS | HEART RATE: 75 BPM | TEMPERATURE: 97.9 F | RESPIRATION RATE: 18 BRPM | HEIGHT: 69 IN | OXYGEN SATURATION: 96 % | BODY MASS INDEX: 27.25 KG/M2 | SYSTOLIC BLOOD PRESSURE: 140 MMHG | DIASTOLIC BLOOD PRESSURE: 90 MMHG

## 2023-01-13 DIAGNOSIS — M25.512 ACUTE PAIN OF LEFT SHOULDER: Primary | ICD-10-CM

## 2023-01-13 DIAGNOSIS — R10.11 ABDOMINAL DISCOMFORT IN RIGHT UPPER QUADRANT: ICD-10-CM

## 2023-01-13 PROCEDURE — G8484 FLU IMMUNIZE NO ADMIN: HCPCS | Performed by: NURSE PRACTITIONER

## 2023-01-13 PROCEDURE — 1036F TOBACCO NON-USER: CPT | Performed by: NURSE PRACTITIONER

## 2023-01-13 PROCEDURE — G8427 DOCREV CUR MEDS BY ELIG CLIN: HCPCS | Performed by: NURSE PRACTITIONER

## 2023-01-13 PROCEDURE — G8419 CALC BMI OUT NRM PARAM NOF/U: HCPCS | Performed by: NURSE PRACTITIONER

## 2023-01-13 PROCEDURE — 3017F COLORECTAL CA SCREEN DOC REV: CPT | Performed by: NURSE PRACTITIONER

## 2023-01-13 PROCEDURE — 99204 OFFICE O/P NEW MOD 45 MIN: CPT | Performed by: NURSE PRACTITIONER

## 2023-01-13 RX ORDER — ALBUTEROL SULFATE 90 UG/1
2 AEROSOL, METERED RESPIRATORY (INHALATION) EVERY 6 HOURS PRN
Qty: 1 EACH | Refills: 5 | Status: SHIPPED | OUTPATIENT
Start: 2023-01-13

## 2023-01-13 RX ORDER — HYDROCHLOROTHIAZIDE 12.5 MG/1
12.5 CAPSULE, GELATIN COATED ORAL DAILY
Qty: 30 CAPSULE | Refills: 5 | Status: SHIPPED | OUTPATIENT
Start: 2023-01-13

## 2023-01-13 RX ORDER — CYCLOBENZAPRINE HCL 10 MG
TABLET ORAL
COMMUNITY
Start: 2023-01-09

## 2023-01-13 RX ORDER — AMLODIPINE BESYLATE 5 MG/1
TABLET ORAL
Qty: 30 TABLET | Refills: 5 | Status: SHIPPED | OUTPATIENT
Start: 2023-01-13

## 2023-01-13 RX ORDER — BUDESONIDE AND FORMOTEROL FUMARATE DIHYDRATE 160; 4.5 UG/1; UG/1
AEROSOL RESPIRATORY (INHALATION)
Qty: 10.2 G | Refills: 5 | Status: SHIPPED | OUTPATIENT
Start: 2023-01-13

## 2023-01-13 RX ORDER — IBUPROFEN 800 MG/1
800 TABLET ORAL EVERY 8 HOURS PRN
Qty: 30 TABLET | Refills: 5 | Status: SHIPPED | OUTPATIENT
Start: 2023-01-13 | End: 2023-02-12

## 2023-01-13 RX ORDER — TIOTROPIUM BROMIDE INHALATION SPRAY 1.56 UG/1
SPRAY, METERED RESPIRATORY (INHALATION)
Qty: 1 EACH | Refills: 5 | Status: SHIPPED | OUTPATIENT
Start: 2023-01-13

## 2023-01-13 SDOH — ECONOMIC STABILITY: FOOD INSECURITY: WITHIN THE PAST 12 MONTHS, YOU WORRIED THAT YOUR FOOD WOULD RUN OUT BEFORE YOU GOT MONEY TO BUY MORE.: NEVER TRUE

## 2023-01-13 SDOH — ECONOMIC STABILITY: FOOD INSECURITY: WITHIN THE PAST 12 MONTHS, THE FOOD YOU BOUGHT JUST DIDN'T LAST AND YOU DIDN'T HAVE MONEY TO GET MORE.: NEVER TRUE

## 2023-01-13 ASSESSMENT — PATIENT HEALTH QUESTIONNAIRE - PHQ9
SUM OF ALL RESPONSES TO PHQ QUESTIONS 1-9: 0
2. FEELING DOWN, DEPRESSED OR HOPELESS: 0
SUM OF ALL RESPONSES TO PHQ QUESTIONS 1-9: 0
SUM OF ALL RESPONSES TO PHQ QUESTIONS 1-9: 0
SUM OF ALL RESPONSES TO PHQ9 QUESTIONS 1 & 2: 0
SUM OF ALL RESPONSES TO PHQ QUESTIONS 1-9: 0
DEPRESSION UNABLE TO ASSESS: PT REFUSES
1. LITTLE INTEREST OR PLEASURE IN DOING THINGS: 0

## 2023-01-13 ASSESSMENT — ENCOUNTER SYMPTOMS
CHEST TIGHTNESS: 0
RHINORRHEA: 0
RECTAL PAIN: 0
ABDOMINAL DISTENTION: 1
EYE PAIN: 0
EYE DISCHARGE: 0
SORE THROAT: 0
EYE ITCHING: 0
APNEA: 0
EYE REDNESS: 0
BACK PAIN: 0
FACIAL SWELLING: 0
DIARRHEA: 0
SHORTNESS OF BREATH: 0
ABDOMINAL PAIN: 0
NAUSEA: 0
SINUS PRESSURE: 0
COUGH: 0
SINUS PAIN: 0
VOICE CHANGE: 0
CONSTIPATION: 0
COLOR CHANGE: 0
STRIDOR: 0
PHOTOPHOBIA: 0
ANAL BLEEDING: 0
VOMITING: 0
CHOKING: 0
TROUBLE SWALLOWING: 0
WHEEZING: 0
BLOOD IN STOOL: 0

## 2023-01-13 ASSESSMENT — ANXIETY QUESTIONNAIRES
3. WORRYING TOO MUCH ABOUT DIFFERENT THINGS: 0
1. FEELING NERVOUS, ANXIOUS, OR ON EDGE: 0
5. BEING SO RESTLESS THAT IT IS HARD TO SIT STILL: 0
GAD7 TOTAL SCORE: 0
6. BECOMING EASILY ANNOYED OR IRRITABLE: 0
7. FEELING AFRAID AS IF SOMETHING AWFUL MIGHT HAPPEN: 0
4. TROUBLE RELAXING: 0
2. NOT BEING ABLE TO STOP OR CONTROL WORRYING: 0
IF YOU CHECKED OFF ANY PROBLEMS ON THIS QUESTIONNAIRE, HOW DIFFICULT HAVE THESE PROBLEMS MADE IT FOR YOU TO DO YOUR WORK, TAKE CARE OF THINGS AT HOME, OR GET ALONG WITH OTHER PEOPLE: NOT DIFFICULT AT ALL

## 2023-01-13 ASSESSMENT — SOCIAL DETERMINANTS OF HEALTH (SDOH): HOW HARD IS IT FOR YOU TO PAY FOR THE VERY BASICS LIKE FOOD, HOUSING, MEDICAL CARE, AND HEATING?: NOT HARD AT ALL

## 2023-01-13 NOTE — PROGRESS NOTES
23  [facility] : 1970 Sex: male  Age: 50 y.o. Chief Complaint   Patient presents with    New Patient     Establishment and a problem     Shoulder Pain     Left shoulder pain    Flank Pain     Rite side pain it is swollen. Has had this for over 6 months was seen for it previously and no change nothing has been done for this       Patient here to establish to the practice. He has been having some concerns. He injured his left shoulder, feeling it pop a few days ago. He states he had similar problem with his right shoulder in the past and had to have surgery on that. He is having problems raising his arm to the side. He is able to raise it in front. It is quite painful. Patient states he has had right sided abdominal and flank tenderness and swelling since last spring. He had CT scan that showed cysts on the liver. He also had an MRI which he tells me had something to do with a blood vessel in the liver but I don't have the report to refer to as of yet. He states the GI doctor or his previous doctor has done nothing for him. He states it feels like his abdomen is full like you just ate a large meal but it is all the time. States it isn't really a pain. No nausea, vomiting, diarrhea, constipation, bloody or tarry stools. Review of Systems   Constitutional:  Negative for activity change, appetite change, chills, diaphoresis, fatigue, fever and unexpected weight change. HENT:  Negative for congestion, dental problem, drooling, ear discharge, ear pain, facial swelling, hearing loss, mouth sores, nosebleeds, postnasal drip, rhinorrhea, sinus pressure, sinus pain, sneezing, sore throat, tinnitus, trouble swallowing and voice change. Eyes:  Negative for photophobia, pain, discharge, redness, itching and visual disturbance. Respiratory:  Negative for apnea, cough, choking, chest tightness, shortness of breath, wheezing and stridor.     Cardiovascular:  Negative for chest pain, palpitations and leg swelling. Gastrointestinal:  Positive for abdominal distention. Negative for abdominal pain, anal bleeding, blood in stool, constipation, diarrhea, nausea, rectal pain and vomiting. Endocrine: Negative for cold intolerance, heat intolerance, polydipsia, polyphagia and polyuria. Genitourinary:  Positive for flank pain (right). Negative for decreased urine volume, difficulty urinating, dysuria, enuresis, frequency, genital sores, hematuria and urgency. Musculoskeletal:  Positive for arthralgias (left shoulder). Negative for back pain, gait problem, joint swelling, myalgias, neck pain and neck stiffness. Skin:  Negative for color change, pallor, rash and wound. Allergic/Immunologic: Negative for environmental allergies, food allergies and immunocompromised state. Neurological:  Negative for dizziness, tremors, seizures, syncope, facial asymmetry, speech difficulty, weakness, light-headedness, numbness and headaches. Hematological:  Negative for adenopathy. Does not bruise/bleed easily. Psychiatric/Behavioral:  Negative for agitation, behavioral problems, confusion, decreased concentration, hallucinations, self-injury, sleep disturbance and suicidal ideas. The patient is not nervous/anxious and is not hyperactive. Current Outpatient Medications:     cyclobenzaprine (FLEXERIL) 10 MG tablet, take 1 tablet by mouth three times a day if needed, Disp: , Rfl:     ibuprofen (IBU) 800 MG tablet, Take 1 tablet by mouth every 8 hours as needed for Pain Take with food. , Disp: 30 tablet, Rfl: 5    albuterol sulfate HFA (PROVENTIL HFA) 108 (90 Base) MCG/ACT inhaler, Inhale 2 puffs into the lungs every 6 hours as needed for Wheezing, Disp: 1 each, Rfl: 5    SYMBICORT 160-4.5 MCG/ACT AERO, inhale 2 puffs by mouth twice a day -MORNING AND EVENING, Disp: 10.2 g, Rfl: 5    SPIRIVA RESPIMAT 1.25 MCG/ACT AERS inhaler, inhale 2 puffs by mouth and INTO THE LUNGS once daily, Disp: 1 each, Rfl: 5 hydroCHLOROthiazide (MICROZIDE) 12.5 MG capsule, Take 1 capsule by mouth daily, Disp: 30 capsule, Rfl: 5    amLODIPine (NORVASC) 5 MG tablet, take 1 tablet by mouth once daily, Disp: 30 tablet, Rfl: 5  Allergies   Allergen Reactions    Nickel Rash       Past Medical History:   Diagnosis Date    Asthma     COPD (chronic obstructive pulmonary disease) ([facility] [address]     Depression     Hypertension     Shoulder pain     right for OR 4-15-21     Past Surgical History:   Procedure Laterality Date    SHOULDER ARTHROSCOPY Right 4/15/2021    RIGHT SHOULDER ARTHROSCOPY ROTATOR CUFF REPAIR  REGENETEN IMPLANT SUBACROMIAL DECOMPRESSION, BICEP TENODESIS performed by Jeremías Recinos MD at [facility] OR     No family history on file.   Social History     Socioeconomic History    Marital status:      Spouse name: Not on file    Number of children: Not on file    Years of education: Not on file    Highest education level: Not on file   Occupational History    Not on file   Tobacco Use    Smoking status: Former     Packs/day: 0.50     Years: 20.00     Pack years: 10.00     Types: Cigarettes     Quit date: 10/17/2022     Years since quittin.2    Smokeless tobacco: Never   Vaping Use    Vaping Use: Some days    Substances: Nicotine, Flavoring    Devices: Disposable   Substance and Sexual Activity    Alcohol use: No    Drug use: Yes     Types: Marijuana Leila Ricardo)     Comment: daily    Sexual activity: Not Currently     Partners: Female   Other Topics Concern    Not on file   Social History Narrative    Not on file     Social Determinants of Health     Financial Resource Strain: Low Risk     Difficulty of Paying Living Expenses: Not hard at all   Food Insecurity: No Food Insecurity    Worried About Running Out of Food in the Last Year: Never true    Ran Out of Food in the Last Year: Never true   Transportation Needs: Not on file   Physical Activity: Not on file   Stress: Not on file   Social Connections: Not on file   Intimate Partner Violence: Not on file   Housing Stability: Not on file     Patient Active Problem List   Diagnosis    Status post arthroscopy of right shoulder        Vitals:    01/13/23 1426   BP: (!) 140/90   Pulse: 75   Resp: 18   Temp: 97.9 °F (36.6 °C)   TempSrc: Temporal   SpO2: 96%   Weight: 184 lb (83.5 kg)   Height: 5' 9\" (1.753 m)       Physical Exam  Vitals and nursing note reviewed. Constitutional:       General: He is not in acute distress. Appearance: Normal appearance. He is normal weight. He is not ill-appearing, toxic-appearing or diaphoretic. HENT:      Head: Normocephalic and atraumatic. Right Ear: Tympanic membrane, ear canal and external ear normal. There is no impacted cerumen. Left Ear: Tympanic membrane, ear canal and external ear normal. There is no impacted cerumen. Nose: Nose normal. No congestion or rhinorrhea. Mouth/Throat:      Mouth: Mucous membranes are moist.      Pharynx: Oropharynx is clear. No oropharyngeal exudate or posterior oropharyngeal erythema. Eyes:      General: No scleral icterus. Right eye: No discharge. Left eye: No discharge. Extraocular Movements: Extraocular movements intact. Conjunctiva/sclera: Conjunctivae normal.      Pupils: Pupils are equal, round, and reactive to light. Neck:      Vascular: No carotid bruit. Cardiovascular:      Rate and Rhythm: Normal rate and regular rhythm. Pulses: Normal pulses. Heart sounds: Normal heart sounds. No murmur heard. No friction rub. No gallop. Pulmonary:      Effort: Pulmonary effort is normal. No respiratory distress. Breath sounds: No stridor. No wheezing, rhonchi or rales. Chest:      Chest wall: No tenderness. Abdominal:      General: Bowel sounds are normal. There is no distension. Palpations: Abdomen is soft. There is no mass. Tenderness: There is no abdominal tenderness.  There is no right CVA tenderness, left CVA tenderness, guarding or rebound. Hernia: No hernia is present. Musculoskeletal:         General: Tenderness (left shoulder) present. No swelling, deformity or signs of injury. Cervical back: Normal range of motion and neck supple. No rigidity. No muscular tenderness. Right lower leg: No edema. Left lower leg: No edema. Comments: ROM left shoulder 90 degree abduction. Extension 180   Lymphadenopathy:      Cervical: No cervical adenopathy. Skin:     General: Skin is warm and dry. Capillary Refill: Capillary refill takes less than 2 seconds. Coloration: Skin is not jaundiced or pale. Findings: No bruising, erythema, lesion or rash. Neurological:      General: No focal deficit present. Mental Status: He is alert and oriented to person, place, and time. Mental status is at baseline. Cranial Nerves: No cranial nerve deficit. Sensory: No sensory deficit. Motor: No weakness. Coordination: Coordination normal.      Gait: Gait normal.      Deep Tendon Reflexes: Reflexes normal.   Psychiatric:         Mood and Affect: Mood normal.         Behavior: Behavior normal.         Thought Content: Thought content normal.         Judgment: Judgment normal.       Assessment and Plan:  Fabian Delaney was seen today for new patient, shoulder pain and flank pain. Diagnoses and all orders for this visit:    Acute pain of left shoulder  -     XR SHOULDER LEFT (MIN 2 VIEWS); Future    Abdominal discomfort in right upper quadrant    Other orders  -     ibuprofen (IBU) 800 MG tablet; Take 1 tablet by mouth every 8 hours as needed for Pain Take with food. -     albuterol sulfate HFA (PROVENTIL HFA) 108 (90 Base) MCG/ACT inhaler;  Inhale 2 puffs into the lungs every 6 hours as needed for Wheezing  -     SYMBICORT 160-4.5 MCG/ACT AERO; inhale 2 puffs by mouth twice a day -MORNING AND EVENING  -     SPIRIVA RESPIMAT 1.25 MCG/ACT AERS inhaler; inhale 2 puffs by mouth and INTO THE LUNGS once daily  - hydroCHLOROthiazide (MICROZIDE) 12.5 MG capsule; Take 1 capsule by mouth daily  -     amLODIPine (NORVASC) 5 MG tablet; take 1 tablet by mouth once daily      Discussions/Education provided to patients during visit:  [] Discussed the importance to stop smoking. [] Advised to monitor eating habits. [x] Reviewed and discussed Imaging results. [] Reviewed and discussed Lab results. [] Discussed the importance of drinking plenty of fluids. [] Cut down on Salt and Caffeine.  [] Exercise 2-3 times weekly, if not more. [] Cut down on Sugar and Fats. [x] Continue Medications as Discussed. [x] Communicated with patient any concerns, to phone office. [x] Follow up as directed. Return in about 1 week (around 1/20/2023).       Seen By:      WALLY Pinon - CNP

## 2023-01-16 ENCOUNTER — TELEPHONE (OUTPATIENT)
Dept: FAMILY MEDICINE CLINIC | Age: 53
End: 2023-01-16

## 2023-01-16 NOTE — LETTER
[facility] [id number]  Phone: [phone]  Fax: WALLY Heath CNP        January 16, 2023     Patient: Seb Lundberg. YOB: 1970   Date of Visit: 1/16/2023       To Whom It May Concern: It is my medical opinion that Abiola Ross is on work restrictions  he is not to use his left arm or Shoulder at all till seen by Orthopedics. No lifting. He may only use his right at this time. If you have any questions or concerns, please don't hesitate to call.     Sincerely,        WALLY Carr CNP

## 2023-01-16 NOTE — TELEPHONE ENCOUNTER
Jody Favre called had X rays done today,he knows results are not in yet, but went to work last night [employer] is requesting a note stating how many pounds can he lift with the way his shoulder is? Or  should he lift not at all. Just needs a note stating either way.

## 2023-01-16 NOTE — Clinical Note
[facility] [address]  Phone: [phone]  Fax: [phone], APRN - CNP        January 16, 2023    [address] [facility]      Dear Keri Morrow:    ***    If you have any questions or concerns, please don't hesitate to call.     Sincerely,        WALLY Olguin CNP

## 2023-01-17 DIAGNOSIS — M25.512 ACUTE PAIN OF LEFT SHOULDER: Primary | ICD-10-CM

## 2023-01-20 ENCOUNTER — OFFICE VISIT (OUTPATIENT)
Dept: FAMILY MEDICINE CLINIC | Age: 53
End: 2023-01-20
Payer: MEDICAID

## 2023-01-20 VITALS
OXYGEN SATURATION: 95 % | TEMPERATURE: 97.8 F | RESPIRATION RATE: 17 BRPM | BODY MASS INDEX: 28.27 KG/M2 | WEIGHT: 190.9 LBS | SYSTOLIC BLOOD PRESSURE: 126 MMHG | DIASTOLIC BLOOD PRESSURE: 88 MMHG | HEIGHT: 69 IN | HEART RATE: 79 BPM

## 2023-01-20 DIAGNOSIS — M25.512 ACUTE PAIN OF LEFT SHOULDER: Primary | ICD-10-CM

## 2023-01-20 PROCEDURE — 99213 OFFICE O/P EST LOW 20 MIN: CPT | Performed by: NURSE PRACTITIONER

## 2023-01-20 PROCEDURE — G8484 FLU IMMUNIZE NO ADMIN: HCPCS | Performed by: NURSE PRACTITIONER

## 2023-01-20 PROCEDURE — 1036F TOBACCO NON-USER: CPT | Performed by: NURSE PRACTITIONER

## 2023-01-20 PROCEDURE — G8419 CALC BMI OUT NRM PARAM NOF/U: HCPCS | Performed by: NURSE PRACTITIONER

## 2023-01-20 PROCEDURE — 3017F COLORECTAL CA SCREEN DOC REV: CPT | Performed by: NURSE PRACTITIONER

## 2023-01-20 PROCEDURE — G8427 DOCREV CUR MEDS BY ELIG CLIN: HCPCS | Performed by: NURSE PRACTITIONER

## 2023-01-20 ASSESSMENT — ENCOUNTER SYMPTOMS
COUGH: 0
FACIAL SWELLING: 0
EYE DISCHARGE: 0
RHINORRHEA: 0
BACK PAIN: 0
APNEA: 0
CHEST TIGHTNESS: 0
ANAL BLEEDING: 0
BLOOD IN STOOL: 0
CONSTIPATION: 0
RECTAL PAIN: 0
EYE REDNESS: 0
TROUBLE SWALLOWING: 0
ABDOMINAL PAIN: 0
STRIDOR: 0
SHORTNESS OF BREATH: 0
CHOKING: 0
WHEEZING: 0
PHOTOPHOBIA: 0
NAUSEA: 0
ABDOMINAL DISTENTION: 0
DIARRHEA: 0
VOICE CHANGE: 0
COLOR CHANGE: 0
SINUS PRESSURE: 0
EYE PAIN: 0
SINUS PAIN: 0
VOMITING: 0
SORE THROAT: 0
EYE ITCHING: 0

## 2023-01-20 NOTE — PROGRESS NOTES
23  [facility]. : 1970 Sex: male  Age: 50 y.o. Chief Complaint   Patient presents with    Shoulder Pain     1 week follow up        Patient continues with left shoulder pain. Unable to abduct left arm past 90 degrees. Left arm weaker than right. Has an appointment for MRI on 23      Review of Systems   Constitutional:  Negative for activity change, appetite change, chills, diaphoresis, fatigue, fever and unexpected weight change. HENT:  Negative for congestion, dental problem, drooling, ear discharge, ear pain, facial swelling, hearing loss, mouth sores, nosebleeds, postnasal drip, rhinorrhea, sinus pressure, sinus pain, sneezing, sore throat, tinnitus, trouble swallowing and voice change. Eyes:  Negative for photophobia, pain, discharge, redness, itching and visual disturbance. Respiratory:  Negative for apnea, cough, choking, chest tightness, shortness of breath, wheezing and stridor. Cardiovascular:  Negative for chest pain, palpitations and leg swelling. Gastrointestinal:  Negative for abdominal distention, abdominal pain, anal bleeding, blood in stool, constipation, diarrhea, nausea, rectal pain and vomiting. Endocrine: Negative for cold intolerance, heat intolerance, polydipsia, polyphagia and polyuria. Genitourinary:  Negative for decreased urine volume, difficulty urinating, dysuria, enuresis, flank pain, frequency, genital sores, hematuria and urgency. Musculoskeletal:  Positive for arthralgias (left shoulder pain). Negative for back pain, gait problem, joint swelling, myalgias, neck pain and neck stiffness. Skin:  Negative for color change, pallor, rash and wound. Allergic/Immunologic: Negative for environmental allergies, food allergies and immunocompromised state. Neurological:  Negative for dizziness, tremors, seizures, syncope, facial asymmetry, speech difficulty, weakness, light-headedness, numbness and headaches.    Hematological:  Negative for adenopathy. Does not bruise/bleed easily. Psychiatric/Behavioral:  Negative for agitation, behavioral problems, confusion, decreased concentration, hallucinations, self-injury, sleep disturbance and suicidal ideas. The patient is not nervous/anxious and is not hyperactive. Current Outpatient Medications:     cyclobenzaprine (FLEXERIL) 10 MG tablet, take 1 tablet by mouth three times a day if needed, Disp: , Rfl:     ibuprofen (IBU) 800 MG tablet, Take 1 tablet by mouth every 8 hours as needed for Pain Take with food. , Disp: 30 tablet, Rfl: 5    albuterol sulfate HFA (PROVENTIL HFA) 108 (90 Base) MCG/ACT inhaler, Inhale 2 puffs into the lungs every 6 hours as needed for Wheezing, Disp: 1 each, Rfl: 5    SYMBICORT 160-4.5 MCG/ACT AERO, inhale 2 puffs by mouth twice a day -MORNING AND EVENING, Disp: 10.2 g, Rfl: 5    SPIRIVA RESPIMAT 1.25 MCG/ACT AERS inhaler, inhale 2 puffs by mouth and INTO THE LUNGS once daily, Disp: 1 each, Rfl: 5    amLODIPine (NORVASC) 5 MG tablet, take 1 tablet by mouth once daily, Disp: 30 tablet, Rfl: 5  Allergies   Allergen Reactions    Nickel Rash       Past Medical History:   Diagnosis Date    Asthma     COPD (chronic obstructive pulmonary disease) ([facility] [address]     Depression     Hypertension     Shoulder pain     right for OR 4-15-21     Past Surgical History:   Procedure Laterality Date    SHOULDER ARTHROSCOPY Right 4/15/2021    RIGHT SHOULDER ARTHROSCOPY ROTATOR CUFF REPAIR  REGENETEN IMPLANT SUBACROMIAL DECOMPRESSION, BICEP TENODESIS performed by Nash Riley MD at [address] [facility]     History reviewed. No pertinent family history.   Social History     Socioeconomic History    Marital status:      Spouse name: Not on file    Number of children: Not on file    Years of education: Not on file    Highest education level: Not on file   Occupational History    Not on file   Tobacco Use    Smoking status: Former     Packs/day: 0.50     Years: 20.00     Pack years: 10.00     Types: Cigarettes     Quit date: 10/17/2022     Years since quittin.2    Smokeless tobacco: Never   Vaping Use    Vaping Use: Some days    Substances: Nicotine, Flavoring    Devices: Disposable   Substance and Sexual Activity    Alcohol use: No    Drug use: Yes     Types: Marijuana Armjones Fish)     Comment: daily    Sexual activity: Not Currently     Partners: Female   Other Topics Concern    Not on file   Social History Narrative    Not on file     Social Determinants of Health     Financial Resource Strain: Low Risk     Difficulty of Paying Living Expenses: Not hard at all   Food Insecurity: No Food Insecurity    Worried About Running Out of Food in the Last Year: Never true    Ran Out of Food in the Last Year: Never true   Transportation Needs: Not on file   Physical Activity: Not on file   Stress: Not on file   Social Connections: Not on file   Intimate Partner Violence: Not on file   Housing Stability: Not on file     Patient Active Problem List   Diagnosis    Status post arthroscopy of right shoulder        Vitals:    23 1315   BP: 126/88   Site: Left Upper Arm   Position: Sitting   Cuff Size: Large Adult   Pulse: 79   Resp: 17   Temp: 97.8 °F (36.6 °C)   TempSrc: Temporal   SpO2: 95%   Weight: 190 lb 14.4 oz (86.6 kg)   Height: 5' 9\" (1.753 m)       Physical Exam  Vitals and nursing note reviewed. Constitutional:       General: He is not in acute distress. Appearance: Normal appearance. He is normal weight. He is not ill-appearing, toxic-appearing or diaphoretic. HENT:      Head: Normocephalic and atraumatic. Right Ear: Tympanic membrane, ear canal and external ear normal. There is no impacted cerumen. Left Ear: Tympanic membrane, ear canal and external ear normal. There is no impacted cerumen. Nose: Nose normal. No congestion or rhinorrhea. Mouth/Throat:      Mouth: Mucous membranes are moist.      Pharynx: Oropharynx is clear.  No oropharyngeal exudate or posterior oropharyngeal erythema. Eyes:      General: No scleral icterus. Right eye: No discharge. Left eye: No discharge. Extraocular Movements: Extraocular movements intact. Conjunctiva/sclera: Conjunctivae normal.      Pupils: Pupils are equal, round, and reactive to light. Neck:      Vascular: No carotid bruit. Cardiovascular:      Rate and Rhythm: Normal rate and regular rhythm. Pulses: Normal pulses. Heart sounds: Normal heart sounds. No murmur heard. No friction rub. No gallop. Pulmonary:      Effort: Pulmonary effort is normal. No respiratory distress. Breath sounds: No stridor. No wheezing, rhonchi or rales. Chest:      Chest wall: No tenderness. Abdominal:      General: Abdomen is flat. Bowel sounds are normal. There is no distension. Palpations: Abdomen is soft. There is no mass. Tenderness: There is no abdominal tenderness. There is no right CVA tenderness, left CVA tenderness, guarding or rebound. Hernia: No hernia is present. Musculoskeletal:         General: Tenderness (left shoulder joint) present. No swelling, deformity or signs of injury. Cervical back: Normal range of motion and neck supple. No rigidity. No muscular tenderness. Right lower leg: No edema. Left lower leg: No edema. Comments: Abduction 90 degrees. Extension 180 degrees   Lymphadenopathy:      Cervical: No cervical adenopathy. Skin:     General: Skin is warm and dry. Capillary Refill: Capillary refill takes less than 2 seconds. Coloration: Skin is not jaundiced or pale. Findings: No bruising, erythema, lesion or rash. Neurological:      General: No focal deficit present. Mental Status: He is alert and oriented to person, place, and time. Mental status is at baseline. Cranial Nerves: No cranial nerve deficit. Sensory: No sensory deficit. Motor: No weakness.       Coordination: Coordination normal.      Gait: Gait normal.      Deep Tendon Reflexes: Reflexes normal.   Psychiatric:         Mood and Affect: Mood normal.         Behavior: Behavior normal.         Thought Content: Thought content normal.         Judgment: Judgment normal.       Assessment and Plan:  Neelam Garcia was seen today for shoulder pain. Diagnoses and all orders for this visit:    Acute pain of left shoulder      Discussions/Education provided to patients during visit:  [] Discussed the importance to stop smoking. [] Advised to monitor eating habits. [] Reviewed and discussed Imaging results. [] Reviewed and discussed Lab results. [] Discussed the importance of drinking plenty of fluids. [] Cut down on Salt and Caffeine.  [] Exercise 2-3 times weekly, if not more. [] Cut down on Sugar and Fats. [x] Continue Medications as Discussed. [x] Communicated with patient any concerns, to phone office. [x] Follow up as directed. Return if symptoms worsen or fail to improve.       Seen By:      WALLY Ferrari - CNP no

## 2023-01-26 ENCOUNTER — HOSPITAL ENCOUNTER (OUTPATIENT)
Dept: MRI IMAGING | Age: 53
Discharge: HOME | End: 2023-01-28
Payer: MEDICAID

## 2023-01-26 DIAGNOSIS — M25.512 ACUTE PAIN OF LEFT SHOULDER: ICD-10-CM

## 2023-01-26 PROCEDURE — 73221 MRI JOINT UPR EXTREM W/O DYE: CPT

## 2023-01-30 ENCOUNTER — TELEPHONE (OUTPATIENT)
Dept: FAMILY MEDICINE CLINIC | Age: 53
End: 2023-01-30

## 2023-01-30 DIAGNOSIS — M75.102 TEAR OF LEFT SUPRASPINATUS TENDON: Primary | ICD-10-CM

## 2023-01-30 NOTE — TELEPHONE ENCOUNTER
----- Message from WALLY Walker CNP sent at 1/30/2023  8:44 AM EST -----  Partial tear of supraspinatus tendon needs ortho consult. Does he have a ortho in mind?

## 2023-01-31 ENCOUNTER — TELEPHONE (OUTPATIENT)
Dept: FAMILY MEDICINE CLINIC | Age: 53
End: 2023-01-31

## 2023-01-31 NOTE — TELEPHONE ENCOUNTER
Patient called states his work needs a note stating he is seeing ortho on the 17th for his consult and just needs note to say he was referred to ortho surgery

## 2023-02-17 ENCOUNTER — OFFICE VISIT (OUTPATIENT)
Dept: ORTHOPEDIC SURGERY | Age: 53
End: 2023-02-17

## 2023-02-17 VITALS — BODY MASS INDEX: 28.14 KG/M2 | HEIGHT: 69 IN | WEIGHT: 190 LBS

## 2023-02-17 DIAGNOSIS — M75.102 TEAR OF LEFT SUPRASPINATUS TENDON: Primary | ICD-10-CM

## 2023-02-17 RX ORDER — HYDROCHLOROTHIAZIDE 12.5 MG/1
CAPSULE, GELATIN COATED ORAL
COMMUNITY
Start: 2023-02-09

## 2023-02-17 NOTE — PROGRESS NOTES
[facility]   ORTHOPAEDIC SURGERY AND SPORTS MEDICINE  DATE OF VISIT: 02/17/23  New Shoulder Patient Visit     Referring Provider:   Ramon Schultz, WALLY - CNP  [address] [facility] Bjorn Zendejas    CHIEF COMPLAINT:   Chief Complaint   Patient presents with    Work Related Injury     1/11/2023 - he states he reached down and pulled a lever, states felt like a \"charley horse , something gave out\", was unable to move the arm much, states pain and limited ROM    Other     Referral - M75.102 (ICD-10-CM) - Tear of left supraspinatus tendon - Evi Shane, APRMAGDALENO - CNP    Pain     Pain with movement - 9 / 10        HPI:      Darryl Napoles is a 50y.o. year old male who is seen today  for evaluation of left shoulder pain. Patient is known to the practice has been seen and treated for right shoulder rotator cuff tear treated surgically in the past.  He reports new onset of shoulder pain 1 month ago. Patient states that while at work he was pulling a lever and felt a sharp pain almost like a charley horse in his shoulder. States that he has had persistent pain and limited range of motion following injury. Treatment has included MRI showing rotator cuff tear. He reports pain at night. Reports symptoms are improved with rest and worsened but with activity. He denies instability. He is right-hand dominant. He is not currently working due to his injury. Patient is currently employed by [employer]         PAST MEDICAL HISTORY  Past Medical History:   Diagnosis Date    Asthma     COPD (chronic obstructive pulmonary disease) ([facility] [address]     Depression     Hypertension     Shoulder pain     right for OR 4-15-21       PAST SURGICAL HISTORY  Past Surgical History:   Procedure Laterality Date    SHOULDER ARTHROSCOPY Right 4/15/2021    RIGHT SHOULDER ARTHROSCOPY ROTATOR CUFF REPAIR  REGENETEN IMPLANT SUBACROMIAL DECOMPRESSION, BICEP TENODESIS performed by Deepthi Vargas MD at [facility]   No family history on file.    SOCIAL HISTORY  Social History     Occupational History    Not on file   Tobacco Use    Smoking status: Former     Packs/day: 0.50     Years: 20.00     Pack years: 10.00     Types: Cigarettes     Quit date: 10/17/2022     Years since quittin.3    Smokeless tobacco: Never   Vaping Use    Vaping Use: Some days    Substances: Nicotine, Flavoring    Devices: Disposable   Substance and Sexual Activity    Alcohol use: No    Drug use: Yes     Types: Marijuana (Weed)     Comment: daily    Sexual activity: Not Currently     Partners: Female       CURRENT MEDICATIONS     Current Outpatient Medications:     hydroCHLOROthiazide (MICROZIDE) 12.5 MG capsule, , Disp: , Rfl:     cyclobenzaprine (FLEXERIL) 10 MG tablet, take 1 tablet by mouth three times a day if needed, Disp: , Rfl:     ibuprofen (IBU) 800 MG tablet, Take 1 tablet by mouth every 8 hours as needed for Pain Take with food. , Disp: 30 tablet, Rfl: 5    albuterol sulfate HFA (PROVENTIL HFA) 108 (90 Base) MCG/ACT inhaler, Inhale 2 puffs into the lungs every 6 hours as needed for Wheezing, Disp: 1 each, Rfl: 5    SYMBICORT 160-4.5 MCG/ACT AERO, inhale 2 puffs by mouth twice a day -MORNING AND EVENING, Disp: 10.2 g, Rfl: 5    SPIRIVA RESPIMAT 1.25 MCG/ACT AERS inhaler, inhale 2 puffs by mouth and INTO THE LUNGS once daily, Disp: 1 each, Rfl: 5    amLODIPine (NORVASC) 5 MG tablet, take 1 tablet by mouth once daily, Disp: 30 tablet, Rfl: 5    ALLERGIES  Allergies   Allergen Reactions    Nickel Rash       Controlled Substances Monitoring:        REVIEW OF SYSTEMS:     Constitutional:  Negative for weight loss, fevers, chills, fatigue  Cardiovascular: Negative for chest pain, palpitations  Pulmonary: Negative for shortness of breath, labored breathing, cough  GI: negative for abdominal pain, nausea, vomiting   MSK: per HPI  Skin: negative for rash, open wounds    All other systems reviewed and are negative     PHYSICAL EXAM     VITALS: Ht 5' 8.5\" (1.74 m)   Wt 190 lb (86.2 kg) Comment: per pt  BMI 28.47 kg/m²       General: The patient is alert and oriented x 3, appears to be stated age and in no distress. HEENT: head is normocephalic, atraumatic. EOMI. Neck: supple, trachea midline, no thyromegaly   Cardiovascular: peripheral pulses palpable. Normal Capillary refill   Respiratory: breathing unlabored, chest expansion symmetric   Skin: no rash, no open wounds, no erythema  Psych: normal affect; mood stable  Neurologic: gait normal, sensation grossly intact in extremities  MSK:    Cervical Spine: There is no tenderness to palpation along the cervical spine. Range of motion is normal.  Spurling's is negative    Shoulder Exam:   Left shoulder exam range of motion 160/70/lumbar region. Lateral arm raise elicited worsening pain. Jobes test positive for pain and mild weakness. Positive pain with speeds test.  Worsening pain and weakness with [place]'s test.  Belly press test intact. IMAGING:     Recent MRI reviewed shows evidence of bursal sided partial-thickness rotator cuff tear of the supraspinatus. There is some signal within the subscapularis indicating possible tear. Biceps appears in the bicipital groove. Radiographic findings reviewed with patient    ASSESSMENT   Left shoulder partial-thickness rotator cuff tear      PLAN  Today we discussed his left shoulder. Patient dealt with a similar issue on the right side treated surgically several years ago that he did very well from. MRI reviewed today showing bursal sided partial-thickness tear of the supraspinatus with probable tearing of the subscapularis. Injury was initially suffered 1 month ago when he felt a sharp pain in his shoulder when pulling a lever. Given MRI findings he is interested in proceeding with surgical management. Surgery will involve a left shoulder arthroscopy, rotator cuff repair versus debridement, possible biceps tenotomy versus tenodesis.   We will look to have this approved through [employer] and have scheduled in the near future. ANGELES Hernandez  Orthopedic Surgery   02/17/23  11:14 AM      Staff Addendum    I have seen and evaluated the patient and agree with the assessment and plan as documented by Milad Peraza CNP. I have performed the key components of the history and physical examination and concur with the findings and plan, and have made changes where appropriate/necessary. Agree with above. He is well-known to me from previous treatment of his right shoulder for which she underwent biceps tenodesis and rotator cuff repair for a traumatic injury. He similarly had an injury to his left shoulder at work where he felt a ripping sensation. He has had pain since that time. He underwent an MRI showing biceps subluxation and high-grade partial versus small full-thickness tear involving his anterior supraspinatus. This correlates with findings on physical exam.  Given the traumatic nature of his injury, as well as success with operative treatment on the contralateral side, he is interested in surgery. We discussed this would involve left shoulder arthroscopy, rotator cuff repair versus debridement, possible biceps tenodesis versus tenotomy, possible Regeneten augmentation.   This will have to be approved through [employer] and we will schedule the near future after approval.  He is in agreement with this plan      Yuliya Wolff MD  [address]

## 2023-02-21 ENCOUNTER — OFFICE VISIT (OUTPATIENT)
Dept: FAMILY MEDICINE CLINIC | Age: 53
End: 2023-02-21

## 2023-02-21 VITALS
WEIGHT: 190 LBS | BODY MASS INDEX: 28.14 KG/M2 | RESPIRATION RATE: 18 BRPM | HEIGHT: 69 IN | HEART RATE: 75 BPM | TEMPERATURE: 97.9 F | OXYGEN SATURATION: 98 % | SYSTOLIC BLOOD PRESSURE: 130 MMHG | DIASTOLIC BLOOD PRESSURE: 78 MMHG

## 2023-02-21 DIAGNOSIS — M75.102 TEAR OF LEFT SUPRASPINATUS TENDON: Primary | ICD-10-CM

## 2023-02-21 SDOH — ECONOMIC STABILITY: INCOME INSECURITY: HOW HARD IS IT FOR YOU TO PAY FOR THE VERY BASICS LIKE FOOD, HOUSING, MEDICAL CARE, AND HEATING?: NOT HARD AT ALL

## 2023-02-21 SDOH — ECONOMIC STABILITY: HOUSING INSECURITY
IN THE LAST 12 MONTHS, WAS THERE A TIME WHEN YOU DID NOT HAVE A STEADY PLACE TO SLEEP OR SLEPT IN A SHELTER (INCLUDING NOW)?: NO

## 2023-02-21 SDOH — ECONOMIC STABILITY: FOOD INSECURITY: WITHIN THE PAST 12 MONTHS, THE FOOD YOU BOUGHT JUST DIDN'T LAST AND YOU DIDN'T HAVE MONEY TO GET MORE.: NEVER TRUE

## 2023-02-21 SDOH — ECONOMIC STABILITY: FOOD INSECURITY: WITHIN THE PAST 12 MONTHS, YOU WORRIED THAT YOUR FOOD WOULD RUN OUT BEFORE YOU GOT MONEY TO BUY MORE.: NEVER TRUE

## 2023-02-21 ASSESSMENT — PATIENT HEALTH QUESTIONNAIRE - PHQ9
SUM OF ALL RESPONSES TO PHQ9 QUESTIONS 1 & 2: 0
1. LITTLE INTEREST OR PLEASURE IN DOING THINGS: 0
SUM OF ALL RESPONSES TO PHQ QUESTIONS 1-9: 0
2. FEELING DOWN, DEPRESSED OR HOPELESS: 0
SUM OF ALL RESPONSES TO PHQ QUESTIONS 1-9: 0

## 2023-02-21 NOTE — PROGRESS NOTES
[address]  [address] [facility] : 1970 Sex: male  Age: 50 y.o. Chief Complaint   Patient presents with    Shoulder Pain     Workers Comp visit today,Left shoulder pain        Patient here today for [employer]. visit for left shoulder pain supraspinatus tear. He is supposed to be having surgery soon. He is having pain in that shoulder and is limited in terms of his range of motion. He states anything more than about 80 degrees is painful. He has been following directions in terms of not lifting. Review of Systems   Constitutional:  Negative for activity change, appetite change, chills, diaphoresis, fatigue, fever and unexpected weight change. HENT:  Negative for congestion, dental problem, drooling, ear discharge, ear pain, facial swelling, hearing loss, mouth sores, nosebleeds, postnasal drip, rhinorrhea, sinus pressure, sinus pain, sneezing, sore throat, tinnitus, trouble swallowing and voice change. Eyes:  Negative for photophobia, pain, discharge, redness, itching and visual disturbance. Respiratory:  Negative for apnea, cough, choking, chest tightness, shortness of breath, wheezing and stridor. Cardiovascular:  Negative for chest pain, palpitations and leg swelling. Gastrointestinal:  Negative for abdominal distention, abdominal pain, anal bleeding, blood in stool, constipation, diarrhea, nausea, rectal pain and vomiting. Endocrine: Negative for cold intolerance, heat intolerance, polydipsia, polyphagia and polyuria. Genitourinary:  Negative for decreased urine volume, difficulty urinating, dysuria, enuresis, flank pain, frequency, genital sores, hematuria and urgency. Musculoskeletal:  Positive for arthralgias. Negative for back pain, gait problem, joint swelling, myalgias, neck pain and neck stiffness. Skin:  Negative for color change, pallor, rash and wound. Allergic/Immunologic: Negative for environmental allergies, food allergies and immunocompromised state. Neurological:  Negative for dizziness, tremors, seizures, syncope, facial asymmetry, speech difficulty, weakness, light-headedness, numbness and headaches. Hematological:  Negative for adenopathy. Does not bruise/bleed easily. Psychiatric/Behavioral:  Negative for agitation, behavioral problems, confusion, decreased concentration, hallucinations, self-injury, sleep disturbance and suicidal ideas. The patient is not nervous/anxious and is not hyperactive. Current Outpatient Medications:     hydroCHLOROthiazide (MICROZIDE) 12.5 MG capsule, , Disp: , Rfl:     cyclobenzaprine (FLEXERIL) 10 MG tablet, take 1 tablet by mouth three times a day if needed, Disp: , Rfl:     ibuprofen (IBU) 800 MG tablet, Take 1 tablet by mouth every 8 hours as needed for Pain Take with food. , Disp: 30 tablet, Rfl: 5    albuterol sulfate HFA (PROVENTIL HFA) 108 (90 Base) MCG/ACT inhaler, Inhale 2 puffs into the lungs every 6 hours as needed for Wheezing, Disp: 1 each, Rfl: 5    SYMBICORT 160-4.5 MCG/ACT AERO, inhale 2 puffs by mouth twice a day -MORNING AND EVENING, Disp: 10.2 g, Rfl: 5    SPIRIVA RESPIMAT 1.25 MCG/ACT AERS inhaler, inhale 2 puffs by mouth and INTO THE LUNGS once daily, Disp: 1 each, Rfl: 5    amLODIPine (NORVASC) 5 MG tablet, take 1 tablet by mouth once daily, Disp: 30 tablet, Rfl: 5  Allergies   Allergen Reactions    Nickel Rash       Past Medical History:   Diagnosis Date    Asthma     COPD (chronic obstructive pulmonary disease) ([facility] [address]     Depression     Hypertension     Shoulder pain     right for OR 4-15-21     Past Surgical History:   Procedure Laterality Date    SHOULDER ARTHROSCOPY Right 4/15/2021    RIGHT SHOULDER ARTHROSCOPY ROTATOR CUFF REPAIR  REGENETEN IMPLANT SUBACROMIAL DECOMPRESSION, BICEP TENODESIS performed by Javan Homans, MD at [facility] OR     No family history on file.   Social History     Socioeconomic History    Marital status:      Spouse name: Not on file    Number of children: Not on file    Years of education: Not on file    Highest education level: Not on file   Occupational History    Not on file   Tobacco Use    Smoking status: Former     Packs/day: 0.50     Years: 20.00     Pack years: 10.00     Types: Cigarettes     Quit date: 10/17/2022     Years since quittin.3    Smokeless tobacco: Never   Vaping Use    Vaping Use: Some days    Substances: Nicotine, Flavoring    Devices: Disposable   Substance and Sexual Activity    Alcohol use: No    Drug use: Yes     Types: Marijuana Nan Zaida)     Comment: daily    Sexual activity: Not Currently     Partners: Female   Other Topics Concern    Not on file   Social History Narrative    Not on file     Social Determinants of Health     Financial Resource Strain: Low Risk     Difficulty of Paying Living Expenses: Not hard at all   Food Insecurity: No Food Insecurity    Worried About Running Out of Food in the Last Year: Never true    [address] in the Last Year: Never true   Transportation Needs: Unknown    Lack of Transportation (Medical): Not on file    Lack of Transportation (Non-Medical): No   Physical Activity: Not on file   Stress: Not on file   Social Connections: Not on file   Intimate Partner Violence: Not on file   Housing Stability: Unknown    Unable to Pay for Housing in the Last Year: Not on file    Number of Places Lived in the Last Year: Not on file    Unstable Housing in the Last Year: No     Patient Active Problem List   Diagnosis    Status post arthroscopy of right shoulder        Vitals:    23 1336   BP: 130/78   Pulse: 75   Resp: 18   Temp: 97.9 °F (36.6 °C)   TempSrc: Temporal   SpO2: 98%   Weight: 190 lb (86.2 kg)   Height: 5' 8.5\" (1.74 m)       Physical Exam  Vitals and nursing note reviewed. Constitutional:       General: He is not in acute distress. Appearance: Normal appearance. He is normal weight. He is not ill-appearing, toxic-appearing or diaphoretic. HENT:      Head: Normocephalic and atraumatic. Right Ear: Tympanic membrane, ear canal and external ear normal. There is no impacted cerumen. Left Ear: Tympanic membrane, ear canal and external ear normal. There is no impacted cerumen. Nose: Nose normal. No congestion or rhinorrhea. Mouth/Throat:      Mouth: Mucous membranes are moist.      Pharynx: Oropharynx is clear. No oropharyngeal exudate or posterior oropharyngeal erythema. Eyes:      General: No scleral icterus. Right eye: No discharge. Left eye: No discharge. Extraocular Movements: Extraocular movements intact. Conjunctiva/sclera: Conjunctivae normal.      Pupils: Pupils are equal, round, and reactive to light. Neck:      Vascular: No carotid bruit. Cardiovascular:      Rate and Rhythm: Normal rate and regular rhythm. Pulses: Normal pulses. Heart sounds: Normal heart sounds. No murmur heard. No friction rub. No gallop. Pulmonary:      Effort: Pulmonary effort is normal. No respiratory distress. Breath sounds: No stridor. No wheezing, rhonchi or rales. Chest:      Chest wall: No tenderness. Abdominal:      General: Abdomen is flat. Bowel sounds are normal. There is no distension. Palpations: Abdomen is soft. There is no mass. Tenderness: There is no abdominal tenderness. There is no right CVA tenderness, left CVA tenderness, guarding or rebound. Hernia: No hernia is present. Musculoskeletal:         General: Tenderness (left shoulder at cuff) present. No swelling, deformity or signs of injury. Cervical back: Normal range of motion and neck supple. No rigidity. No muscular tenderness. Right lower leg: No edema. Left lower leg: No edema. Comments: Decreased ROM to 80 degrees abduction and extension. Decreased  strength of the left hand. Lymphadenopathy:      Cervical: No cervical adenopathy. Skin:     General: Skin is warm and dry.       Capillary Refill: Capillary refill takes less than 2 seconds. Coloration: Skin is not jaundiced or pale. Findings: No bruising, erythema, lesion or rash. Neurological:      General: No focal deficit present. Mental Status: He is alert and oriented to person, place, and time. Mental status is at baseline. Cranial Nerves: No cranial nerve deficit. Sensory: No sensory deficit. Motor: No weakness. Coordination: Coordination normal.      Gait: Gait normal.      Deep Tendon Reflexes: Reflexes normal.   Psychiatric:         Mood and Affect: Mood normal.         Behavior: Behavior normal.         Thought Content: Thought content normal.         Judgment: Judgment normal.       Assessment and Plan:  Lupe Lombardi was seen today for shoulder pain. Diagnoses and all orders for this visit:    Tear of left supraspinatus tendon      Discussions/Education provided to patients during visit:  [] Discussed the importance to stop smoking. [] Advised to monitor eating habits. [] Reviewed and discussed Imaging results. [] Reviewed and discussed Lab results. [] Discussed the importance of drinking plenty of fluids. [] Cut down on Salt and Caffeine.  [] Exercise 2-3 times weekly, if not more. [] Cut down on Sugar and Fats. [x] Continue Medications as Discussed. [x] Communicated with patient any concerns, to phone office. [x] Follow up as directed. Return in about 1 month (around 3/21/2023).       Seen By:      Sheryle Finger, APRN - MARINA

## 2023-02-22 ASSESSMENT — ENCOUNTER SYMPTOMS
PHOTOPHOBIA: 0
VOICE CHANGE: 0
WHEEZING: 0
CHOKING: 0
BACK PAIN: 0
SINUS PAIN: 0
RHINORRHEA: 0
TROUBLE SWALLOWING: 0
SHORTNESS OF BREATH: 0
EYE PAIN: 0
APNEA: 0
SORE THROAT: 0
COLOR CHANGE: 0
CHEST TIGHTNESS: 0
FACIAL SWELLING: 0
DIARRHEA: 0
CONSTIPATION: 0
ABDOMINAL DISTENTION: 0
EYE DISCHARGE: 0
EYE REDNESS: 0
STRIDOR: 0
NAUSEA: 0
ANAL BLEEDING: 0
EYE ITCHING: 0
SINUS PRESSURE: 0
RECTAL PAIN: 0
BLOOD IN STOOL: 0
VOMITING: 0
COUGH: 0
ABDOMINAL PAIN: 0

## 2023-02-27 ENCOUNTER — COMMUNITY OUTREACH (OUTPATIENT)
Dept: FAMILY MEDICINE CLINIC | Age: 53
End: 2023-02-27

## 2023-04-10 ENCOUNTER — TELEPHONE (OUTPATIENT)
Dept: ORTHOPEDIC SURGERY | Age: 53
End: 2023-04-10

## 2023-05-23 ENCOUNTER — TELEPHONE (OUTPATIENT)
Dept: ORTHOPEDIC SURGERY | Age: 53
End: 2023-05-23

## 2023-05-31 ENCOUNTER — OFFICE VISIT (OUTPATIENT)
Dept: ORTHOPEDIC SURGERY | Age: 53
End: 2023-05-31

## 2023-05-31 VITALS — WEIGHT: 190 LBS | BODY MASS INDEX: 28.14 KG/M2 | HEIGHT: 69 IN

## 2023-05-31 DIAGNOSIS — M75.102 TEAR OF LEFT SUPRASPINATUS TENDON: Primary | ICD-10-CM

## 2023-05-31 NOTE — PROGRESS NOTES
consent form to proceed.     3.  Patient and family were provided with pre-op and post-op instructions, prescription medications, and any other supplied needed post op (slings, braces, etc.)    Controlled Substances Monitoring:            Moira Lilly MD  Orthopaedic Surgery   5/31/23  10:50 AM

## 2023-06-01 ENCOUNTER — TELEPHONE (OUTPATIENT)
Dept: FAMILY MEDICINE CLINIC | Age: 53
End: 2023-06-01

## 2023-06-01 DIAGNOSIS — M75.102 TEAR OF LEFT SUPRASPINATUS TENDON: Primary | ICD-10-CM

## 2023-06-01 NOTE — TELEPHONE ENCOUNTER
Patient calling states he has to have a referral for A cardiologist for cardiac clearance for his Surgery, coming up,he was told his PCP has to order that

## 2023-06-07 ENCOUNTER — TELEPHONE (OUTPATIENT)
Dept: CARDIOLOGY | Age: 53
End: 2023-06-07

## 2023-06-07 ENCOUNTER — OFFICE VISIT (OUTPATIENT)
Dept: CARDIOLOGY CLINIC | Age: 53
End: 2023-06-07
Payer: MEDICAID

## 2023-06-07 VITALS
HEART RATE: 55 BPM | DIASTOLIC BLOOD PRESSURE: 80 MMHG | BODY MASS INDEX: 30.92 KG/M2 | RESPIRATION RATE: 18 BRPM | SYSTOLIC BLOOD PRESSURE: 130 MMHG | HEIGHT: 68 IN | WEIGHT: 204 LBS

## 2023-06-07 DIAGNOSIS — Z01.818 PRE-OP EXAMINATION: Primary | ICD-10-CM

## 2023-06-07 PROCEDURE — 93000 ELECTROCARDIOGRAM COMPLETE: CPT | Performed by: INTERNAL MEDICINE

## 2023-06-07 PROCEDURE — 99203 OFFICE O/P NEW LOW 30 MIN: CPT | Performed by: INTERNAL MEDICINE

## 2023-06-07 ASSESSMENT — ENCOUNTER SYMPTOMS
DIARRHEA: 0
NAUSEA: 0
COUGH: 0
SHORTNESS OF BREATH: 1
BLOOD IN STOOL: 0
CONSTIPATION: 0
WHEEZING: 0
BACK PAIN: 0
ABDOMINAL PAIN: 0
VOMITING: 0

## 2023-06-07 NOTE — TELEPHONE ENCOUNTER
Spoke w/ patient to confirm stress test for Friday, 6/9/23, starting at 1130. Instructions given and medications reviewed. Patient instructed to bring inhaler to test.  All questions answered.

## 2023-06-07 NOTE — PROGRESS NOTES
bilaterally with no wheezing or crackles. Chest:      Chest wall: No tenderness. Abdominal:      General: Bowel sounds are normal. There is no distension. Palpations: Abdomen is soft. There is no mass. Tenderness: There is no abdominal tenderness. Comments: No abdominal bruit. Musculoskeletal:      Cervical back: Neck supple. Right lower leg: No edema. Left lower leg: No edema. Skin:     General: Skin is warm and dry. Neurological:      Mental Status: He is alert and oriented to person, place, and time. Laboratory Tests:  Lab Results   Component Value Date    CREATININE 0.8 11/07/2022    BUN 8 11/07/2022     11/07/2022    K 3.6 11/07/2022     11/07/2022    CO2 22 11/07/2022     No results found for: MG  Lab Results   Component Value Date    WBC 7.6 11/07/2022    HGB 17.5 (H) 11/07/2022    HCT 48.6 11/07/2022    MCV 85.4 11/07/2022     11/07/2022     Lab Results   Component Value Date    ALT 15 11/07/2022    AST 19 11/07/2022    ALKPHOS 102 11/07/2022    BILITOT 0.3 11/07/2022         Cardiac Tests:    Coty Fujita done on 03/31/2021:  1. The myocardial perfusion is abnormal.  2. Reversible defect in the inferior wall extending to the apical  inferior and apical lateral wall suggestive of possible ischemia. 3. No fixed defects to suggest prior myocardial infarction. 4. Normal systolic function, EF 13% with apical hypokinesis. 5. There is no transient ischemic dilatation. 6. Intermediate risk myocardial perfusion study. ASSESSMENT / PLAN:  -Preop clearance: Patient orthopedic surgery carries an intermediate risk for perioperative cardiovascular events. Due to patient abnormal Lexiscan done approximately 2 years ago, patient will be referred to undergo a treadmill stress test prior to clearing him for his orthopedic surgery.  -Hypertension: Controlled. -COPD. -Obesity. I will continue patient on his current blood pressure medications.   Patient was Intact

## 2023-06-08 RX ORDER — BUDESONIDE AND FORMOTEROL FUMARATE DIHYDRATE 160; 4.5 UG/1; UG/1
AEROSOL RESPIRATORY (INHALATION)
Qty: 10.2 G | Refills: 5 | Status: SHIPPED | OUTPATIENT
Start: 2023-06-08

## 2023-06-09 ENCOUNTER — HOSPITAL ENCOUNTER (OUTPATIENT)
Dept: CARDIOLOGY | Age: 53
Discharge: HOME | End: 2023-06-09
Payer: MEDICAID

## 2023-06-09 VITALS
DIASTOLIC BLOOD PRESSURE: 88 MMHG | SYSTOLIC BLOOD PRESSURE: 138 MMHG | HEIGHT: 68 IN | BODY MASS INDEX: 30.92 KG/M2 | OXYGEN SATURATION: 95 % | RESPIRATION RATE: 18 BRPM | HEART RATE: 65 BPM | WEIGHT: 204 LBS

## 2023-06-09 DIAGNOSIS — Z98.890 STATUS POST ARTHROSCOPY OF LEFT SHOULDER: Primary | ICD-10-CM

## 2023-06-09 DIAGNOSIS — Z01.818 PRE-OP EXAMINATION: ICD-10-CM

## 2023-06-09 PROCEDURE — 93017 CV STRESS TEST TRACING ONLY: CPT

## 2023-06-09 RX ORDER — KETOROLAC TROMETHAMINE 10 MG/1
10 TABLET, FILM COATED ORAL EVERY 6 HOURS
Qty: 8 TABLET | Refills: 0 | Status: SHIPPED | OUTPATIENT
Start: 2023-06-12 | End: 2023-06-14

## 2023-06-09 RX ORDER — HYDROCODONE BITARTRATE AND ACETAMINOPHEN 5; 325 MG/1; MG/1
1 TABLET ORAL EVERY 6 HOURS PRN
Qty: 28 TABLET | Refills: 0 | Status: SHIPPED | OUTPATIENT
Start: 2023-06-12 | End: 2023-06-19

## 2023-06-09 NOTE — TELEPHONE ENCOUNTER
Patient cleared by cardio, stress negative. In chart. Per anesthesia ok to proceed at surgery center.

## 2023-06-09 NOTE — PROCEDURES
[address] [facility] - [address], [address] [place], [place] [phone]    Exercise Stress Study       Name: [facility] Account Number:  [de-identified]      :  1970          Sex: male         Date of Study:  2023    Height: 5' 8\" (172.7 cm)          Weight - Scale: 204 lb (92.5 kg)    Ordering Provider: Theresa Srivastava MD          PCP: WALLY Walker - MARINA    Cardiologist: Theresa Srivastava MD              Interpreting Physician: Theresa Srivastava MD    Indication:   Preoperative Risk Stratification    Clinical History:   Patient has no known history of coronary artery disease. EKG: Sinus rhythm at 61 bpm.    Exercise: The patient exercised using a Amilcar protocol, completing 7:22 minutes and reaching an estimated work load of 47.3 metabolic equivalents (METS). Resting HR was 61. Peak exercise heart rate was 148 ( 87% of maximum predicted heart rate for age). Baseline /88. Peak exercise /94. The blood pressure response to exercise was hypertensive      Exercise was terminated due to heart rate attained. The patient experienced no chest pain with exercise. Pulse oximetry was used to monitor oxygen saturation during the stress test.  The study was performed on Room Air. The resting pulse oximeter was 95%. The post stress O2 saturation seen during exercise was 95 %. Exercise ECG:   The patient demonstrated no arrhythmias during exercise. No ischemia was noted during exercise or recovery. Impression:    Negative exercise stress test.  Hypertensive blood pressure response to exercise. Exercise capacity was average. Recommendation:  -Increase amlodipine to 10 mg daily.  -Patient is cleared to undergo his surgery from the cardiac standpoint of view. Thank you for sending your patient to this [employer].     Electronically signed by Michele Mercado MD on 23 at 12:20 PM EDT

## 2023-06-22 ENCOUNTER — TELEPHONE (OUTPATIENT)
Dept: ORTHOPEDIC SURGERY | Age: 53
End: 2023-06-22

## 2023-06-22 NOTE — TELEPHONE ENCOUNTER
[address] [facility] NOTE    Patient wishes to proceed after surgery discussion with Dr. Benancio Soulier. Surgical education was discussed and patient was given the surgical handout. Pre/post-op appointments were made as needed. Patient is also aware to obtain any medical clearances prior to surgery, if requested. Notified that pre-admission testing will also be reaching out for education and instructions on arrival time prior to procedure. Patient is to obtain clearance(s) from: Dr. Ronny Stein. Cardiac -- OBTAINED. SCANNED INTO MEDIA.       Authorization submitted to [employer]    Status: APPROVED       Surgery: Left shoulder arthroscopy, rotator cuff repair versus debridement, possible biceps tenodesis versus tenotomy, possible Regeneten augmentation  OR: 7/20 [place] (RESCHEDULED FROM 6/13)   Vendor: [employer]   Block: LUISITO   CPT: 29960 + 14430   DX: Y48.685

## 2023-07-11 RX ORDER — AMLODIPINE BESYLATE 5 MG/1
TABLET ORAL
Qty: 30 TABLET | Refills: 5 | Status: SHIPPED | OUTPATIENT
Start: 2023-07-11

## 2023-07-11 RX ORDER — HYDROCHLOROTHIAZIDE 12.5 MG/1
12.5 CAPSULE, GELATIN COATED ORAL DAILY
Qty: 30 CAPSULE | Refills: 5 | Status: SHIPPED | OUTPATIENT
Start: 2023-07-11

## 2023-07-18 ENCOUNTER — CLINICAL DOCUMENTATION (OUTPATIENT)
Dept: ORTHOPEDIC SURGERY | Age: 53
End: 2023-07-18

## 2023-07-18 NOTE — PROGRESS NOTES
Department of Orthopedic Surgery  Attending Pre-operative History and Physical        DIAGNOSIS:  rotator cuff tear, left shoulder     INDICATION:  failed conservative treatment    PROCEDURE:  Left shoulder arthroscopy, rotator cuff repair versus debridement, possible biceps tenotomy versus tenodesis    CHIEF COMPLAINT:  left shoulder pain    History Obtained From:  patient    HISTORY OF PRESENT ILLNESS:      The patient is a 50 y.o. male with left ureter cuff tear. He has failed conservative treatment. He is interested in surgery. He was initially scheduled for surgery in early June that was cancelled due to chest congestion. We discussed with involve left shoulder arthroscopy, rotator cuff repair versus Regeneten augmentation, possible biceps tenotomy versus tenodesis. He has undergone this procedure on the contralateral side and has done very well. Risks of surgery were discussed in detail including but not limited to infection, neurovascular injury, retear, stiffness, DVT/pulmonary embolus, death from anesthesia. He understands and would like to proceed    Past Medical History:        Diagnosis Date    Asthma     COPD (chronic obstructive pulmonary disease) ([address])     Depression     Hypertension     Shoulder pain     right for OR 4-15-21       Past Surgical History:        Procedure Laterality Date    SHOULDER ARTHROSCOPY Right 4/15/2021    RIGHT SHOULDER ARTHROSCOPY ROTATOR CUFF REPAIR  REGENETEN IMPLANT SUBACROMIAL DECOMPRESSION, BICEP TENODESIS performed by Jun Garcia MD at [facility] OR       Medications Prior to Admission:   Not in a hospital admission. Allergies:  Nickel    History of allergic reaction to anesthesia:  No    Social History:   TOBACCO:   reports that he quit smoking about 9 months ago. His smoking use included cigarettes. He has a 10.00 pack-year smoking history. He has never used smokeless tobacco.  ETOH:   reports no history of alcohol use. DRUGS:   reports current drug use.

## 2023-07-21 ENCOUNTER — OFFICE VISIT (OUTPATIENT)
Dept: ORTHOPEDIC SURGERY | Age: 53
End: 2023-07-21

## 2023-07-21 DIAGNOSIS — Z98.890 S/P ARTHROSCOPY OF LEFT SHOULDER: Primary | ICD-10-CM

## 2023-07-21 DIAGNOSIS — M75.102 TEAR OF LEFT SUPRASPINATUS TENDON: ICD-10-CM

## 2023-07-21 PROCEDURE — 99024 POSTOP FOLLOW-UP VISIT: CPT | Performed by: ORTHOPAEDIC SURGERY

## 2023-07-21 NOTE — PROGRESS NOTES
[facility]   ORTHOPAEDIC SURGERY AND SPORTS MEDICINE  DATE OF VISIT: 07/21/23  Follow Up Post Operative Visit     CHIEF COMPLAINT:   Chief Complaint   Patient presents with    Follow Up After Procedure     1 day p/o L shoulder scope 7/20        Surgery: left shoulder RC repair (speed fix)  Date: 7/20/2023    Subjective:    Preet Fallen. is here for follow up visit s/p above procedure. He is doing well. He has been compliant. Notes less pain with the shoulder compared to the other 1    Controlled Substances Monitoring:        Physical Exam:    No data recorded    General: Alert and oriented x3, no acute distress  Cardiovascular/pulmonary: No labored breathing, peripheral perfusion intact  Musculoskeletal:    Exam of the shoulder shows intact incisions. Intact motor and sensory function throughout the arm. Swelling minimal      Imaging: X-rays reviewed showing no acute abnormality    Assessment and Plan: Status post left shoulder rotator cuff repair  He is doing well. Went over operative findings. Went over postop protocol.   Follow-up in 6 weeks    Albino Dillard MD  Orthopaedic Surgery   7/21/23  11:18 AM

## 2023-09-01 ENCOUNTER — OFFICE VISIT (OUTPATIENT)
Dept: ORTHOPEDIC SURGERY | Age: 53
End: 2023-09-01

## 2023-09-01 VITALS — BODY MASS INDEX: 30.46 KG/M2 | HEIGHT: 68 IN | WEIGHT: 201 LBS

## 2023-09-01 DIAGNOSIS — Z98.890 S/P ARTHROSCOPY OF LEFT SHOULDER: Primary | ICD-10-CM

## 2023-09-01 PROCEDURE — 99024 POSTOP FOLLOW-UP VISIT: CPT | Performed by: ORTHOPAEDIC SURGERY

## 2023-09-01 NOTE — PROGRESS NOTES
[facility]   ORTHOPAEDIC SURGERY AND SPORTS MEDICINE  DATE OF VISIT: 09/01/23  Follow Up Post Operative Visit     CHIEF COMPLAINT:   Chief Complaint   Patient presents with    Post-Op Check     6 weeks out left shoulder rotator cuff repair. Surgery: left shoulder RC repair (speed fix)  Date: 7/20/2023       Subjective:    Corin Guzman is here for follow up visit s/p above procedure. He is doing well. He has been compliant    Controlled Substances Monitoring:        Physical Exam:    No data recorded    General: Alert and oriented x3, no acute distress  Cardiovascular/pulmonary: No labored breathing, peripheral perfusion intact  Musculoskeletal:    Exam of the shoulder shows full active and passive range of motion with no pain. He has good strength with rotator cuff testing. Incisions are healed      Imaging: No new images    Assessment and Plan: 6 weeks out from left shoulder rotator cuff repair  He is doing very well. He will start PT at [facility]. We will see him back in 6 weeks. We discussed restrictions today.     Carmen Lopez MD  Orthopaedic Surgery   9/1/23  10:54 AM

## 2023-09-14 ENCOUNTER — TELEPHONE (OUTPATIENT)
Dept: PHYSICAL THERAPY | Age: 53
End: 2023-09-14

## 2023-09-19 ENCOUNTER — EVALUATION (OUTPATIENT)
Dept: PHYSICAL THERAPY | Age: 53
End: 2023-09-19

## 2023-09-19 DIAGNOSIS — M25.512 ACUTE PAIN OF LEFT SHOULDER: Primary | ICD-10-CM

## 2023-09-19 NOTE — PROGRESS NOTES
[address] [facility]   Phone: [phone]   Fax: [phone]      Physical Therapy Daily Treatment Note    Date: 2023  Patient Name: Keiry Snyder. : 1970   MRN: [id number]  DOInjury: 23  DOSx: 23   Referring Provider: Jessica Peterson MD  [address] [facility],  [address] [facility] Diagnosis:         Outcome Measure:     S: pt reports that he has been performing rom exercises at home    O: Please refer to PT Eval  Time 5626-0826     Visit  Repeat outcome measure at mid point and end. Pain      Strength      Palpation      ROM      Modalities            Manual                  Stretch      Table slides      Wall Flexion      Wall ER stretch      Towel IR stretch      IR reaching behind back      Exercise      Mat exercises X20 supine sh flex & serratus anterior punch  X1 supine sh ABCs  X20 side-lying sh abd, ER, & horizontal abd  X20 prone sh I, Y, T, & W, sh ext, & scap row  NR   Bent over X20 scap row  X20 sh I, Y, T, W  X20 sh ext  NR   ROM Exercises X20 towel wall climbs sh flex, scaption, & abd  X20 wand activities sh flex, abd, scaption, & IR behind back  X30 OHP sh flex  TE                                                                           ROWS: H Functional activities  To aid in ROM and strength needed for reaching , lifting ,pushing and pulling at home/work    ROWS: M  \"    ROWS: L  \"    ER  \"    IR  \"                A:  Tolerated well. Above added to written HEP.     P: Continue with rehab plan    Ty Razo,  [facility] [place]    Treatment Charges: Mins Units   Initial Evaluation 15 1   Re-Evaluation     Ther Exercise         TE 12 1   Manual Therapy     MT     Ther Activities        TA     Gait Training          GT     Neuro Re-education NR 28 2   Modalities     Non-Billable Service Time     Other     Total Time/Units 55 5

## 2023-09-19 NOTE — PROGRESS NOTES
[address] [facility] [address] [facility]   Phone: [phone]   Fax: [phone]           Date:  2023   Patient: Bharti Parks. : 1970  MRN: [id number]  Referring Provider: Favian Barba MD  [address] [facility],  [address] [facility] Diagnosis:     J27.389 (ICD-10-CM) - S/P arthroscopy of left shoulder    Surgery: left shoulder RC repair (speed fix)  Date: 2023     SUBJECTIVE:     Onset date: 2023    Onset[de-identified] Sudden onset    Mechanism of Injury / History: The pt sustained work related injury to left sh which required surgiccal correction 23. Patient is right handed. Previous PT: PT outpt Rehab for right sh surgery. Pt has not received PT outpt Rehab for s/p left sh surgery. Medical Management for Current Problem: surgery left sh 23. Chief complaint: pain, decreased motion, weakness, inability / limited ability to use arm, limited ability to lift/carry/handle material, limited ability to complete home/outdoor chores/tasks    Behavior: condition is staying the same    Pain:     pt presents with post surgical soreness, pain, & stiffness at left sh. Imaging results: No results found.     Past Medical History:  Past Medical History:   Diagnosis Date    Asthma     COPD (chronic obstructive pulmonary disease) ([facility])     Depression     Hypertension     Shoulder pain     right for OR 4-15-21     Past Surgical History:   Procedure Laterality Date    SHOULDER ARTHROSCOPY Right 4/15/2021    RIGHT SHOULDER ARTHROSCOPY ROTATOR CUFF REPAIR  REGENETEN IMPLANT SUBACROMIAL DECOMPRESSION, BICEP TENODESIS performed by Favian Barba MD at [facility] OR       Medications:   Current Outpatient Medications   Medication Sig Dispense Refill    hydroCHLOROthiazide (MICROZIDE) 12.5 MG capsule Take 1 capsule by mouth daily 30 capsule 5    amLODIPine (NORVASC) 5 MG tablet take 1 tablet by mouth once daily 30 tablet 5    naproxen (NAPROSYN) 500 MG tablet Take 1 tablet by

## 2023-09-21 ENCOUNTER — TREATMENT (OUTPATIENT)
Dept: PHYSICAL THERAPY | Age: 53
End: 2023-09-21

## 2023-09-21 DIAGNOSIS — M25.512 ACUTE PAIN OF LEFT SHOULDER: Primary | ICD-10-CM

## 2023-09-21 NOTE — PROGRESS NOTES
[address] [facility]   Phone: [phone]   Fax: [phone]      Physical Therapy Daily Treatment Note    Date: 2023  Patient Name: Judith Perkins. : 1970   MRN: [id number]  DOInjury: 23  DOSx: 23   Referring Provider: Jose L Oviedo MD  [address] [facility]  Velma Curtis,  [address] [place] [address] Diagnosis:         Outcome Measure:     S: pt reports good compliance with HEP. O: Please refer to PT Eval  Time 8752-7063     Visit  Repeat outcome measure at mid point and end. Pain      Strength      Palpation      ROM      Modalities            Manual                  Stretch      Table slides      Wall Flexion      Wall ER stretch      Towel IR stretch      IR reaching behind back      Exercise      Mat exercises X30 supine sh flex & serratus anterior punch  X1 supine sh ABCs  X30 side-lying sh abd, ER, & horizontal abd  X30 prone sh I, Y, T, & W, sh ext, & scap row  NR   Bent over X30 scap row  X30 sh I, Y, T, W  X30 sh ext  NR   ROM Exercises XX30 OHP sh flex, abd, scaption, ER, & behind back IR  TE    front of mirror X30 sh flex, abd, scaption, ER at side, ER at 90 abd, ext  NR                                                                     ROWS: H Functional activities  To aid in ROM and strength needed for reaching , lifting ,pushing and pulling at home/work    ROWS: M  \"    ROWS: L  \"    ER  \"    IR  \"                A:  Tolerated well. The pt experienced no abnormal difficulty or c/o pain with today's Tx session. P: Continue with rehab plan & follow orthopedic surgeon's protocol.     Hannah Rosas, PT [facility] [id number]    Treatment Charges: Mins Units   Initial Evaluation     Re-Evaluation     Ther Exercise         TE 15 1   Manual Therapy     MT     Ther Activities        TA     Gait Training          GT     Neuro Re-education NR 35 2   Modalities     Non-Billable Service Time     Other     Total Time/Units 50 3

## 2023-09-27 ENCOUNTER — TREATMENT (OUTPATIENT)
Dept: PHYSICAL THERAPY | Age: 53
End: 2023-09-27

## 2023-09-27 DIAGNOSIS — M25.512 ACUTE PAIN OF LEFT SHOULDER: Primary | ICD-10-CM

## 2023-09-27 NOTE — PROGRESS NOTES
[address] [facility]   Phone: [phone]   Fax: [phone]      Physical Therapy Daily Treatment Note    Date: 2023  Patient Name: Cristina Mckeon. : 1970   MRN: [id number]  DOInjury: 23  DOSx: 23   Referring Provider: Cintia Siddiqi MD  [address] [facility],  [address] Diagnosis:     F70.697 (ICD-10-CM) - S/P arthroscopy of left shoulder     Surgery: left shoulder RC repair (speed fix)  Date: 2023    Outcome Measure:     S: Pt reports some L shoulder soreness today, thinks he slept on it wrong. O:   Time 7934-6842     Visit 3/12 Repeat outcome measure at mid point and end. Pain      Strength      Palpation      ROM      Modalities            Manual                  Stretch      Table slides      Wall Flexion      Wall ER stretch      Towel IR stretch      IR reaching behind back      Exercise      Mat exercises X30 supine sh flex & serratus anterior punch  X1 supine sh ABCs  X30 side-lying sh abd, ER, & horizontal abd  X30 prone sh I, Y, T, & W, sh ext, & scap row  NR   Bent over X30 scap row  X30 sh I, Y, T, W  X30 sh ext  NR   ROM Exercises XX30 OHP sh flex, abd, scaption, ER, & behind back IR  TE    front of mirror X30 sh flex, abd, scaption, ER at side, ER at 90 abd, ext  NR                                                                     ROWS: H Functional activities  To aid in ROM and strength needed for reaching , lifting ,pushing and pulling at home/work    ROWS: M  \"    ROWS: L  \"    ER  \"    IR  \"           Performed Today     Supine wand shoulder flexion, chest press, ER 20x 10 s holds ea  TE   Supine serratus punch 20x 2 sets  NR   Supine ABCs 3x thru  NR   SL ER, abduction 20x 10 s holds ea  TE   Standing wand shoulder flexion, abduction, IR behind back 20x 10 s holds ea  TE   Shoulder shrugs, scapular squeezes 20x 10 s holds ea  NR   A:  Pt tolerated today's session well.  Pt denied pain throughout

## 2023-10-12 ENCOUNTER — TREATMENT (OUTPATIENT)
Dept: PHYSICAL THERAPY | Age: 53
End: 2023-10-12

## 2023-10-12 DIAGNOSIS — M25.512 ACUTE PAIN OF LEFT SHOULDER: Primary | ICD-10-CM

## 2023-10-12 NOTE — PROGRESS NOTES
[address] [facility]   Phone: [phone]   Fax: [phone]      Physical Therapy Daily Treatment Note    Date: 10/12/2023  Patient Name: No Landry. : 1970   MRN: [id number]  DOInjury: 23  DOSx: 23   Referring Provider: Hermann Dumont MD  [address] [facility],  [address] [place] [address]     Medical Diagnosis:     B17.511 (ICD-10-CM) - S/P arthroscopy of left shoulder     Surgery: left shoulder RC repair (speed fix)  Date: 2023    Outcome Measure:     S: Pt presents with no complaints today to PT.    O:   Time 3013-5046     Visit  Repeat outcome measure at mid point and end. Pain      Strength      Palpation      ROM      Modalities            Manual                  Stretch      Table slides      Wall Flexion      Wall ER stretch      Towel IR stretch      IR reaching behind back      Exercise      Mat exercises X30 supine sh flex & serratus anterior punch  X1 supine sh ABCs  X30 side-lying sh abd, ER, & horizontal abd  X30 prone sh I, Y, T, & W, sh ext, & scap row  NR   ROM Exercises X TE    front of mirror X30 sh flex, abd, scaption, ER at side, ER at 90 abd, ext-3ay  NR                                                                     ROWS: H Functional activities  To aid in ROM and strength needed for reaching , lifting ,pushing and pulling at home/work    ROWS: M  \"    ROWS: L  \"    ER  \"    IR  \"          A:  Pt tolerated today's session well. Pt had no complaints or abnormal difficulty performing & completing today's Tx session. P: Continue with rehab plan & follow orthopedic surgeon's protocol.     Jamison Gonzalez, PT [facility] [id number]  Treatment Charges: Mins Units   Initial Evaluation     Re-Evaluation     Ther Exercise         TE     Manual Therapy     MT     Ther Activities        TA     Gait Training          GT     Neuro Re-education NR 40 3   Modalities     Non-Billable Service Time     Other     Total Time/Units 40 3

## 2023-10-13 ENCOUNTER — TREATMENT (OUTPATIENT)
Dept: PHYSICAL THERAPY | Age: 53
End: 2023-10-13

## 2023-10-13 DIAGNOSIS — M25.512 ACUTE PAIN OF LEFT SHOULDER: Primary | ICD-10-CM

## 2023-10-13 NOTE — PROGRESS NOTES
[address] [facility]   Phone: [phone]   Fax: [phone]      Physical Therapy Daily Treatment Note    Date: 10/13/2023  Patient Name: Maria Elena Franco. : 1970   MRN: [id number]  DOInjury: 23  DOSx: 23   Referring Provider: No referring provider defined for this encounter. Medical Diagnosis:     Z98.890 (ICD-10-CM) - S/P arthroscopy of left shoulder     Surgery: left shoulder RC repair (speed fix)  Date: 2023    Outcome Measure:     S: Pt reports no shoulder pain upon arrival to PT session. Pt arrived early d/t thinking his appointment was at 9:00 instead of 10:00.    O:   Time 1004-6789     Visit  Repeat outcome measure at mid point and end. Pain      Strength      Palpation      ROM      Modalities            Manual                  Stretch      Table slides      Wall Flexion      Wall ER stretch      Towel IR stretch      IR reaching behind back      Exercise      Mat exercises X30 supine sh flex & serratus anterior punch  X1 supine sh ABCs  X30 side-lying sh abd, ER, & horizontal abd  X30 prone sh I, Y, T, & W, sh ext, & scap row  NR   ROM Exercises X TE    front of mirror X30 sh flex, abd, scaption, ER at side, ER at 90 abd, ext-3ay  NR                                                                     ROWS: H Functional activities  To aid in ROM and strength needed for reaching , lifting ,pushing and pulling at home/work    ROWS: M  \"    ROWS: L  \"    ER  \"    IR  \"          Performed Today Supine serratus punch 20x 2 sets L NR   Supine ABCs 2x thru L NR   SL ER, abduction, horizontal abduction 30x each L NR   Prone flexion, scaption, horizontal abduction, extension, rows 30x each L NR   HML rows, shld ext 10x 10 s holds Lime band NR   A:  Pt tolerated today's session well. Today's session was shorter than usual d/t pt arriving earlier than his scheduled appointment.  Pt progressed with light banded exercises targeting scapular muscles to

## 2023-10-16 ENCOUNTER — TREATMENT (OUTPATIENT)
Dept: PHYSICAL THERAPY | Age: 53
End: 2023-10-16

## 2023-10-16 ENCOUNTER — OFFICE VISIT (OUTPATIENT)
Dept: ORTHOPEDIC SURGERY | Age: 53
End: 2023-10-16

## 2023-10-16 VITALS — BODY MASS INDEX: 30.31 KG/M2 | WEIGHT: 200 LBS | HEIGHT: 68 IN

## 2023-10-16 DIAGNOSIS — Z98.890 S/P ARTHROSCOPY OF LEFT SHOULDER: Primary | ICD-10-CM

## 2023-10-16 DIAGNOSIS — M25.512 ACUTE PAIN OF LEFT SHOULDER: Primary | ICD-10-CM

## 2023-10-16 PROCEDURE — 99024 POSTOP FOLLOW-UP VISIT: CPT | Performed by: ORTHOPAEDIC SURGERY

## 2023-10-16 NOTE — PROGRESS NOTES
[facility]   ORTHOPAEDIC SURGERY AND SPORTS MEDICINE  DATE OF VISIT: 10/16/23  Follow Up Post Operative Visit     CHIEF COMPLAINT:   Chief Complaint   Patient presents with    Post-Op Check     3 months out left shoulder rotator cuff repair. Overall doing well. Surgery: Status post left shoulder arthroscopy rotator cuff repair  Date: 7/20/2023    Subjective:    Kd Jarvis. is here for follow up visit s/p above procedure. He is doing well. He has been lifting and has resumed daily activities. He has no complaints today. Controlled Substances Monitoring:        Physical Exam:    No data recorded    General: Alert and oriented x3, no acute distress  Cardiovascular/pulmonary: No labored breathing, peripheral perfusion intact  Musculoskeletal:    Left shoulder exam range of motion 180/85/T6. Intact Jobes, speeds, Washburn's, belly test press. Negative swelling deformity or tenderness. Imaging: reviewed    Assessment and Plan:  Status post left shoulder arthroscopy rotator cuff repair  Patient is 3 months out from procedure listed above. He is doing very well and has been moving a little ahead of scheduled with lifting. He states he is set to complete his final session of PT this afternoon. He has already resumed normal activities and has no complaints today. He is also getting ready to move to [place] in the near future. He will complete PT continue with home exercises. He will follow-up as needed. WALLY Flores-CNP  Orthopedic Surgery   10/16/23  10:22 AM      Staff Addendum    I have seen and evaluated the patient and agree with the assessment and plan as documented by Caprice Zhou CNP. I have performed the key components of the history and physical examination and concur with the findings and plan, and have made changes where appropriate/necessary.           Anthony Naranjo MD  [address]

## 2023-10-26 ENCOUNTER — OFFICE VISIT (OUTPATIENT)
Dept: FAMILY MEDICINE CLINIC | Age: 53
End: 2023-10-26
Payer: COMMERCIAL

## 2023-10-26 VITALS
HEART RATE: 85 BPM | SYSTOLIC BLOOD PRESSURE: 136 MMHG | BODY MASS INDEX: 29.93 KG/M2 | DIASTOLIC BLOOD PRESSURE: 94 MMHG | HEIGHT: 68 IN | RESPIRATION RATE: 16 BRPM | TEMPERATURE: 97.6 F | WEIGHT: 197.5 LBS | OXYGEN SATURATION: 96 %

## 2023-10-26 DIAGNOSIS — F41.9 ANXIETY: Primary | ICD-10-CM

## 2023-10-26 PROCEDURE — 99213 OFFICE O/P EST LOW 20 MIN: CPT | Performed by: NURSE PRACTITIONER

## 2023-10-26 PROCEDURE — G8417 CALC BMI ABV UP PARAM F/U: HCPCS | Performed by: NURSE PRACTITIONER

## 2023-10-26 PROCEDURE — 3017F COLORECTAL CA SCREEN DOC REV: CPT | Performed by: NURSE PRACTITIONER

## 2023-10-26 PROCEDURE — G8484 FLU IMMUNIZE NO ADMIN: HCPCS | Performed by: NURSE PRACTITIONER

## 2023-10-26 PROCEDURE — 1036F TOBACCO NON-USER: CPT | Performed by: NURSE PRACTITIONER

## 2023-10-26 PROCEDURE — G8427 DOCREV CUR MEDS BY ELIG CLIN: HCPCS | Performed by: NURSE PRACTITIONER

## 2023-10-26 RX ORDER — AMLODIPINE BESYLATE 5 MG/1
TABLET ORAL
Qty: 30 TABLET | Refills: 5 | Status: SHIPPED | OUTPATIENT
Start: 2023-10-26

## 2023-10-26 RX ORDER — BUDESONIDE AND FORMOTEROL FUMARATE DIHYDRATE 160; 4.5 UG/1; UG/1
AEROSOL RESPIRATORY (INHALATION)
Qty: 10.2 G | Refills: 5 | Status: SHIPPED | OUTPATIENT
Start: 2023-10-26

## 2023-10-26 RX ORDER — ALBUTEROL SULFATE 90 UG/1
2 AEROSOL, METERED RESPIRATORY (INHALATION) EVERY 6 HOURS PRN
Qty: 1 EACH | Refills: 5 | Status: SHIPPED | OUTPATIENT
Start: 2023-10-26

## 2023-10-26 RX ORDER — TIOTROPIUM BROMIDE INHALATION SPRAY 1.56 UG/1
SPRAY, METERED RESPIRATORY (INHALATION)
Qty: 1 EACH | Refills: 5 | Status: SHIPPED | OUTPATIENT
Start: 2023-10-26

## 2023-10-26 RX ORDER — IBUPROFEN 800 MG/1
800 TABLET ORAL EVERY 8 HOURS PRN
Qty: 30 TABLET | Refills: 5 | Status: SHIPPED | OUTPATIENT
Start: 2023-10-26 | End: 2023-12-25

## 2023-10-26 ASSESSMENT — ENCOUNTER SYMPTOMS
SHORTNESS OF BREATH: 0
CHOKING: 0
VOICE CHANGE: 0
CHEST TIGHTNESS: 0
COUGH: 0
EYE REDNESS: 0
BACK PAIN: 0
CONSTIPATION: 0
SINUS PRESSURE: 0
STRIDOR: 0
NAUSEA: 0
ANAL BLEEDING: 0
EYE DISCHARGE: 0
PHOTOPHOBIA: 0
APNEA: 0
VOMITING: 0
RECTAL PAIN: 0
SORE THROAT: 0
FACIAL SWELLING: 0
DIARRHEA: 0
BLOOD IN STOOL: 0
WHEEZING: 0
SINUS PAIN: 0
RHINORRHEA: 0
EYE PAIN: 0
ABDOMINAL PAIN: 0
TROUBLE SWALLOWING: 0
EYE ITCHING: 0
ABDOMINAL DISTENTION: 0
COLOR CHANGE: 0

## 2023-10-26 NOTE — PROGRESS NOTES
motion and neck supple. No rigidity. No muscular tenderness. Right lower leg: No edema. Left lower leg: No edema. Lymphadenopathy:      Cervical: No cervical adenopathy. Skin:     General: Skin is warm and dry. Capillary Refill: Capillary refill takes less than 2 seconds. Coloration: Skin is not jaundiced or pale. Findings: No bruising, erythema, lesion or rash. Neurological:      General: No focal deficit present. Mental Status: He is alert and oriented to person, place, and time. Mental status is at baseline. Cranial Nerves: No cranial nerve deficit. Sensory: No sensory deficit. Motor: No weakness. Coordination: Coordination normal.      Gait: Gait normal.      Deep Tendon Reflexes: Reflexes normal.   Psychiatric:         Mood and Affect: Mood normal.         Behavior: Behavior normal.         Thought Content: Thought content normal.         Judgment: Judgment normal.         Assessment and Plan:  Judie Rodriguez was seen today for other. Diagnoses and all orders for this visit:    Anxiety    Other orders  -     SPIRIVA RESPIMAT 1.25 MCG/ACT AERS inhaler; inhale 2 puffs by mouth and INTO THE LUNGS once daily  -     SYMBICORT 160-4.5 MCG/ACT AERO; inhale 2 puffs by mouth twice a day -MORNING AND EVENING  -     albuterol sulfate HFA (PROVENTIL HFA) 108 (90 Base) MCG/ACT inhaler; Inhale 2 puffs into the lungs every 6 hours as needed for Wheezing  -     amLODIPine (NORVASC) 5 MG tablet; take 1 tablet by mouth once daily  -     ibuprofen (IBU) 800 MG tablet; Take 1 tablet by mouth every 8 hours as needed for Pain Take with food. Discussions/Education provided to patients during visit:  [] Discussed the importance to stop smoking. [] Advised to monitor eating habits. [] Reviewed and discussed Imaging results. [] Reviewed and discussed Lab results. [] Discussed the importance of drinking plenty of fluids.   [] Cut down on Salt and Caffeine.  [] Exercise 2-3

## 2023-11-16 ENCOUNTER — OFFICE VISIT (OUTPATIENT)
Dept: FAMILY MEDICINE CLINIC | Age: 53
End: 2023-11-16
Payer: COMMERCIAL

## 2023-11-16 VITALS
WEIGHT: 197 LBS | HEIGHT: 68 IN | TEMPERATURE: 98 F | OXYGEN SATURATION: 98 % | RESPIRATION RATE: 17 BRPM | BODY MASS INDEX: 29.86 KG/M2 | SYSTOLIC BLOOD PRESSURE: 148 MMHG | DIASTOLIC BLOOD PRESSURE: 92 MMHG | HEART RATE: 73 BPM

## 2023-11-16 DIAGNOSIS — J30.9 ALLERGIC RHINITIS, UNSPECIFIED SEASONALITY, UNSPECIFIED TRIGGER: Primary | ICD-10-CM

## 2023-11-16 DIAGNOSIS — M75.102 TEAR OF LEFT SUPRASPINATUS TENDON: ICD-10-CM

## 2023-11-16 PROCEDURE — 99213 OFFICE O/P EST LOW 20 MIN: CPT | Performed by: NURSE PRACTITIONER

## 2023-11-16 RX ORDER — CETIRIZINE HYDROCHLORIDE 10 MG/1
10 TABLET ORAL DAILY
Qty: 90 TABLET | Refills: 1 | Status: SHIPPED | OUTPATIENT
Start: 2023-11-16

## 2023-11-16 ASSESSMENT — ENCOUNTER SYMPTOMS
BACK PAIN: 0
EYE REDNESS: 0
RHINORRHEA: 1
EYE DISCHARGE: 0
APNEA: 0
FACIAL SWELLING: 0
EYE PAIN: 0
SINUS PRESSURE: 0
CHOKING: 0
NAUSEA: 0
SORE THROAT: 0
EYE ITCHING: 0
CHEST TIGHTNESS: 0
BLOOD IN STOOL: 0
TROUBLE SWALLOWING: 0
PHOTOPHOBIA: 0
ABDOMINAL PAIN: 0
DIARRHEA: 0
SHORTNESS OF BREATH: 0
CONSTIPATION: 0
WHEEZING: 0
ABDOMINAL DISTENTION: 0
SINUS PAIN: 0
ANAL BLEEDING: 0
RECTAL PAIN: 0
STRIDOR: 0
VOICE CHANGE: 0
COUGH: 0
VOMITING: 0
COLOR CHANGE: 0

## 2023-11-16 NOTE — PROGRESS NOTES
Cervical: No cervical adenopathy. Skin:     General: Skin is warm and dry. Capillary Refill: Capillary refill takes less than 2 seconds. Coloration: Skin is not jaundiced or pale. Findings: No bruising, erythema, lesion or rash. Neurological:      General: No focal deficit present. Mental Status: He is alert and oriented to person, place, and time. Mental status is at baseline. Cranial Nerves: No cranial nerve deficit. Sensory: No sensory deficit. Motor: No weakness. Coordination: Coordination normal.      Gait: Gait normal.      Deep Tendon Reflexes: Reflexes normal.   Psychiatric:         Mood and Affect: Mood normal.         Behavior: Behavior normal.         Thought Content: Thought content normal.         Judgment: Judgment normal.         Assessment and Plan:  Kevin Martin was seen today for letter for school/work and allergies. Diagnoses and all orders for this visit:    Allergic rhinitis, unspecified seasonality, unspecified trigger    Tear of left supraspinatus tendon    Other orders  -     cetirizine (ZYRTEC) 10 MG tablet; Take 1 tablet by mouth daily        Discussions/Education provided to patients during visit:  [] Discussed the importance to stop smoking. [] Advised to monitor eating habits. [] Reviewed and discussed Imaging results. [] Reviewed and discussed Lab results. [] Discussed the importance of drinking plenty of fluids. [] Cut down on Salt and Caffeine.  [] Exercise 2-3 times weekly, if not more. [] Cut down on Sugar and Fats. [x] Continue Medications as Discussed. [x] Communicated with patient any concerns, to phone office. [x] Follow up as directed. Return if symptoms worsen or fail to improve.       Seen By:      WALLY Mai - CNP

## 2024-02-05 RX ORDER — BUDESONIDE AND FORMOTEROL FUMARATE DIHYDRATE 160; 4.5 UG/1; UG/1
AEROSOL RESPIRATORY (INHALATION)
Qty: 10.2 EACH | Refills: 5 | Status: SHIPPED
Start: 2024-02-05 | End: 2024-02-05 | Stop reason: SDUPTHER

## 2024-02-05 RX ORDER — BUDESONIDE AND FORMOTEROL FUMARATE DIHYDRATE 160; 4.5 UG/1; UG/1
AEROSOL RESPIRATORY (INHALATION)
Qty: 10.2 EACH | Refills: 5 | Status: SHIPPED | OUTPATIENT
Start: 2024-02-05

## 2024-02-05 NOTE — TELEPHONE ENCOUNTER
Patients last appointment 11/16/2023.  Patients next scheduled appointment No future appointments.

## 2024-10-22 RX ORDER — ALBUTEROL SULFATE 90 UG/1
INHALANT RESPIRATORY (INHALATION)
Qty: 25.5 EACH | Refills: 5 | Status: SHIPPED | OUTPATIENT
Start: 2024-10-22

## 2024-10-22 NOTE — TELEPHONE ENCOUNTER
Name of Medication(s) Requested:  Requested Prescriptions     Pending Prescriptions Disp Refills    albuterol sulfate HFA (PROVENTIL;VENTOLIN;PROAIR) 108 (90 Base) MCG/ACT inhaler [Pharmacy Med Name: ALBUTEROL HFA (PROAIR) INHALER] 25.5 each 5     Sig: INHALE 2 PUFFS BY MOUTH AND INTO THE LUNGS EVERY 6 HOURS IF NEEDED FOR WHEEZING       Medication is on current medication list Yes    Dosage and directions were verified? Yes    Quantity verified: 30 day supply     Pharmacy Verified?  Yes    Last Appointment:  11/16/2023    Future appts:  No future appointments.     (If no appt send self scheduling link. .REFILLAPPT)  Scheduling request sent?     [] Yes  [x] No    Does patient need updated?  [] Yes  [x] No

## 2024-11-08 RX ORDER — AMLODIPINE BESYLATE 5 MG/1
TABLET ORAL
Qty: 30 TABLET | Refills: 0 | Status: SHIPPED | OUTPATIENT
Start: 2024-11-08

## 2024-12-09 RX ORDER — AMLODIPINE BESYLATE 5 MG/1
TABLET ORAL
Qty: 30 TABLET | Refills: 1 | Status: SHIPPED | OUTPATIENT
Start: 2024-12-09

## 2024-12-09 NOTE — TELEPHONE ENCOUNTER
Name of Medication(s) Requested:  Requested Prescriptions     Pending Prescriptions Disp Refills    amLODIPine (NORVASC) 5 MG tablet [Pharmacy Med Name: AMLODIPINE BESYLATE 5 MG TAB] 30 tablet 1     Sig: TAKE 1 TABLET BY MOUTH ONCE DAILY       Medication is on current medication list Yes    Dosage and directions were verified? Yes    Quantity verified: 30 day supply     Pharmacy Verified?  Yes    Last Appointment:  11/16/2023    Future appts:  No future appointments.     (If no appt send self scheduling link. .REFILLAPPT)  Scheduling request sent?     [x] Yes  [] No    Does patient need updated?  [x] Yes  [] No

## 2025-01-16 NOTE — PROGRESS NOTES
[address] [facility]   Phone: [phone]   Fax: [phone]      Physical Therapy Daily Treatment Note    Date: 2021  Patient Name: Irene Burton : 1970   MRN: [id number]  DOInjury: 4/15/2021  DOSx: 4/15/2021   Referring Provider: Geovanna Amaya MD  [address] [facility]     Medical Diagnosis:     PREOPERATIVE DIAGNOSIS: Biceps subluxation, rotator cuff tear   Right shoulder.      POSTOPERATIVE DIAGNOSIS: Biceps subluxation with tear, rotator cuff tear (upper subscapularis, partial supraspinatus >50%)  Right shoulder.      OPERATION:  Right shoulder arthroscopy, subacromial decompression with acromioplasty, biceps tenodesis, subscapularis repair, supraspinatus repair with Regeneten patch    Outcome Measure: QuickDASH  30% Disability    S: The pt reports that his right sh is 75% back to being normal.    O:   Time [id number]     Visit  Repeat outcome measure at mid point and end. Pain      Strength      Palpation      ROM      Modalities            Manual                  Stretch           TE         Exercise            TE   Prone sh flex \"I\" x30 3lb DB NR   Side-lying sh ER x30 5lb DB TE   Side-lying sh abd x30 3lb DB TE   Side-lying sh horizontal abd x30 3lb DB TE   Prone sh horizontal abd \"T\" x30 3lb DB NR   Prone sh ext x30 5lb DB NR   Prone scap row x30 5lb DB NR   Prone sh Y x30 3lb DB NR   Stand sh ext 3-way x30 each 5lb DB NR   NR   NR   purple NR   purple NR   purple NR   purple NR   purple NR   Stand sh overhead press x30 5lb DB TE   Bent over rows x30 8lb DB NR   Bent over sh horizontal abd (reverse flys) x30 5lb DB NR   Bent over sh ext x30 7lb DB NR   Prone band overhead lat pull scap retract x30 purple NR                         A:  Tolerated well. The pt denied experiencing any abnormal pain with today's exercises. Pt progressed to perform sh overhead press.     P: Continue with rehab plan & progress to include new standing dumbbell resistive training.      Jose Luis Velasquez, PT [facility] [id number]    Treatment Charges: Mins Units   Initial Evaluation     Re-Evaluation     Ther Exercise         TE 10 1   Manual Therapy     MT     Ther Activities        TA     Gait Training          GT     Neuro Re-education NR 30 2   Modalities     Non-Billable Service Time     Other     Total Time/Units 40 3 Ambulnz

## 2025-02-21 RX ORDER — AMLODIPINE BESYLATE 5 MG/1
TABLET ORAL
Qty: 90 TABLET | Refills: 1 | Status: SHIPPED | OUTPATIENT
Start: 2025-02-21

## 2025-02-21 RX ORDER — BUDESONIDE AND FORMOTEROL FUMARATE DIHYDRATE 160; 4.5 UG/1; UG/1
AEROSOL RESPIRATORY (INHALATION)
Qty: 10.2 EACH | Refills: 5 | Status: SHIPPED | OUTPATIENT
Start: 2025-02-21

## 2025-02-21 NOTE — TELEPHONE ENCOUNTER
Name of Medication(s) Requested:  Requested Prescriptions     Pending Prescriptions Disp Refills    SYMBICORT 160-4.5 MCG/ACT AERO [Pharmacy Med Name: SYMBICORT 160-4.5 MCG INHALER] 10.2 each 5     Sig: INHALE 2 PUFFS BY MOUTH TWICE A DAY EVERY MORNING AND EVERY EVENING    amLODIPine (NORVASC) 5 MG tablet [Pharmacy Med Name: AMLODIPINE BESYLATE 5 MG TAB] 90 tablet 1     Sig: TAKE 1 TABLET BY MOUTH ONCE DAILY       Medication is on current medication list Yes    Dosage and directions were verified? Yes    Quantity verified: 90 day supply     Pharmacy Verified?  Yes    Last Appointment:  11/16/2023    Future appts:  No future appointments.     (If no appt send self scheduling link. .REFILLAPPT)  Scheduling request sent?     [] Yes  [x] No    Does patient need updated?  [] Yes  [x] No

## (undated) DEVICE — SUTURE TIGERTAPE TIGERWIRE SZ 2-0 L30IN NONABSORBABLE AR72377T

## (undated) DEVICE — SYRINGE MED 30ML STD CLR PLAS LUERLOCK TIP N CTRL DISP

## (undated) DEVICE — SLEEVE TRAC SPANDEX LAT W/ 4IN COBAN SUPERFICIAL RAD NRV PD

## (undated) DEVICE — SUPER TURBOVAC 90 INTEGRATED CABLE WAND ICW: Brand: COBLATION

## (undated) DEVICE — PACK PROCEDURE SURG GEN CUST

## (undated) DEVICE — DRAPE,REIN 53X77,STERILE: Brand: MEDLINE

## (undated) DEVICE — KIT SURG W7XL11IN 2 PKT UNTREATED NA

## (undated) DEVICE — GLOVE SURG SZ 8 CRM LTX FREE POLYISOPRENE POLYMER BEAD ANTI

## (undated) DEVICE — DRAPE,U/ SHT,SPLIT,PLAS,STERIL: Brand: MEDLINE

## (undated) DEVICE — 1000 S-DRAPE TOWEL DRAPE 10/BX: Brand: STERI-DRAPE™

## (undated) DEVICE — Z DISCONTINUED USE 2275686 GLOVE SURG SZ 8 L12IN FNGR THK13MIL WHT ISOLEX POLYISOPRENE

## (undated) DEVICE — STRIP,CLOSURE,WOUND,MEDI-STRIP,1/2X4: Brand: MEDLINE

## (undated) DEVICE — CAMERA STRYKER 1488

## (undated) DEVICE — ANCHOR SUTURE 3.9X17.9 MM SWIVELOCK BIOCOMP: Type: IMPLANTABLE DEVICE | Site: SHOULDER | Status: NON-FUNCTIONAL

## (undated) DEVICE — NEEDLE SUT PASS FOR ROT CUF LABRAL REP MULTFI SCORPION

## (undated) DEVICE — [AGGRESSIVE PLUS CUTTER, ARTHROSCOPIC SHAVER BLADE,  DO NOT RESTERILIZE,  DO NOT USE IF PACKAGE IS DAMAGED,  KEEP DRY,  KEEP AWAY FROM SUNLIGHT]: Brand: FORMULA

## (undated) DEVICE — PAD,ABDOMINAL,5"X9",ST,LF,25/BX: Brand: MEDLINE INDUSTRIES, INC.

## (undated) DEVICE — GAUZE,SPONGE,4"X4",8PLY,STRL,LF,10/TRAY: Brand: MEDLINE

## (undated) DEVICE — DRAPE,SHOULDER,ORTHOMAX,W/POUCH,5/CS: Brand: MEDLINE

## (undated) DEVICE — 3M™ STERI-DRAPE™ U-DRAPE 1015: Brand: STERI-DRAPE™

## (undated) DEVICE — PACK,SHOULDER SPLIT: Brand: MEDLINE

## (undated) DEVICE — GOWN,SIRUS,POLYRNF,SETINSLV,XL,20/CS: Brand: MEDLINE

## (undated) DEVICE — 3M™ IOBAN™ 2 ANTIMICROBIAL INCISE DRAPE 6640EZ: Brand: IOBAN™ 2

## (undated) DEVICE — 3M™ COBAN™ NL STERILE NON-LATEX SELF-ADHERENT WRAP, 2084S, 4 IN X 5 YD (10 CM X 4,5 M), 18 ROLLS/CASE: Brand: 3M™ COBAN™

## (undated) DEVICE — BLADE CLIPPER GEN PURP NS

## (undated) DEVICE — SOLUTION IV IRRIG LACTATED RINGERS 3000ML 2B7487

## (undated) DEVICE — ALCOHOL 70% RUBBING 16OZ

## (undated) DEVICE — TOWEL,OR,DSP,ST,BLUE,STD,6/PK,12PK/CS: Brand: MEDLINE

## (undated) DEVICE — NEEDLE SPNL L3.5IN PNK HUB S STL REG WALL FIT STYL W/ QNCKE

## (undated) DEVICE — IMMOBILIZER SHLDR L10.5-17IN D7IN SLNG W/ 15DEG ABD PLLW

## (undated) DEVICE — SUTURE SUTTAPE FIBERLINK 1.3MM WHT BLU CLS LOOP AR7535

## (undated) DEVICE — SET ORTHO STD STORTSTD1

## (undated) DEVICE — COVER DSG W7 7 8XL11IN WHT POR CLTH PRECUT WTRPRF MEDIPORE

## (undated) DEVICE — INTENDED FOR TISSUE SEPARATION, AND OTHER PROCEDURES THAT REQUIRE A SHARP SURGICAL BLADE TO PUNCTURE OR CUT.: Brand: BARD-PARKER ® STAINLESS STEEL BLADES

## (undated) DEVICE — NEEDLE FLTR 18GA L1.5IN MEM THK5UM BLNT DISP

## (undated) DEVICE — 4-PORT MANIFOLD: Brand: NEPTUNE 2

## (undated) DEVICE — DOUBLE BASIN SET: Brand: MEDLINE INDUSTRIES, INC.

## (undated) DEVICE — INSTRUMENT SYSTEM 7 BATTERY REUSABLE

## (undated) DEVICE — CHLORAPREP 26ML ORANGE

## (undated) DEVICE — TRAY ARTHREX NEW SHOULDER INSTR REUSABLE

## (undated) DEVICE — CANNULA ARTHSCP L7CM DIA7MM TRNSLUC THRD FLX W/ NO SQUIRT

## (undated) DEVICE — SHOECOVER ANTI-SKID: Brand: CARDINAL HEALTH

## (undated) DEVICE — [STANDARD 12-FLUTE BARREL BUR, ARTHROSCOPIC SHAVER BLADE,  DO NOT RESTERILIZE,  DO NOT USE IF PACKAGE IS DAMAGED,  KEEP DRY,  KEEP AWAY FROM SUNLIGHT]: Brand: FORMULA

## (undated) DEVICE — INSTRUMENT STRYKER SHAVER REUSABLE

## (undated) DEVICE — TRAY ATRYKER ARTHROSCOPIC KNEE INSTR REUSABLE

## (undated) DEVICE — TUBING, SUCTION, 9/32" X 10', STRAIGHT: Brand: MEDLINE

## (undated) DEVICE — DRESSING,GAUZE,XEROFORM,CURAD,1"X8",ST: Brand: CURAD

## (undated) DEVICE — TUBING PMP L16FT MAIN DISP FOR AR-6400 AR-6475